# Patient Record
Sex: MALE | Race: BLACK OR AFRICAN AMERICAN | NOT HISPANIC OR LATINO | Employment: UNEMPLOYED | URBAN - METROPOLITAN AREA
[De-identification: names, ages, dates, MRNs, and addresses within clinical notes are randomized per-mention and may not be internally consistent; named-entity substitution may affect disease eponyms.]

---

## 2018-03-30 ENCOUNTER — OFFICE VISIT (OUTPATIENT)
Dept: URGENT CARE | Facility: CLINIC | Age: 23
End: 2018-03-30
Payer: MEDICAID

## 2018-03-30 VITALS
OXYGEN SATURATION: 99 % | WEIGHT: 172 LBS | SYSTOLIC BLOOD PRESSURE: 122 MMHG | HEIGHT: 74 IN | RESPIRATION RATE: 16 BRPM | TEMPERATURE: 98 F | DIASTOLIC BLOOD PRESSURE: 76 MMHG | HEART RATE: 68 BPM | BODY MASS INDEX: 22.07 KG/M2
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
## 2018-03-30 DIAGNOSIS — M54.2 CERVICALGIA: Primary | ICD-10-CM

## 2018-03-30 PROCEDURE — 99203 OFFICE O/P NEW LOW 30 MIN: CPT | Mod: S$GLB,,, | Performed by: SURGERY

## 2018-03-30 RX ORDER — DEXAMETHASONE SODIUM PHOSPHATE 100 MG/10ML
10 INJECTION INTRAMUSCULAR; INTRAVENOUS ONCE
Status: COMPLETED | OUTPATIENT
Start: 2018-03-30 | End: 2018-03-30

## 2018-03-30 RX ADMIN — DEXAMETHASONE SODIUM PHOSPHATE 10 MG: 100 INJECTION INTRAMUSCULAR; INTRAVENOUS at 04:03
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
## 2018-10-09 ENCOUNTER — HOSPITAL ENCOUNTER (EMERGENCY)
Facility: HOSPITAL | Age: 23
Discharge: HOME | End: 2018-10-10
Attending: EMERGENCY MEDICINE
Payer: MEDICAID

## 2018-10-09 VITALS
SYSTOLIC BLOOD PRESSURE: 138 MMHG | HEART RATE: 59 BPM | DIASTOLIC BLOOD PRESSURE: 70 MMHG | BODY MASS INDEX: 23.1 KG/M2 | HEIGHT: 74 IN | WEIGHT: 180 LBS | OXYGEN SATURATION: 100 % | RESPIRATION RATE: 14 BRPM | TEMPERATURE: 98 F

## 2018-10-09 DIAGNOSIS — H57.89 IRRITATION OF RIGHT EYE: Primary | ICD-10-CM

## 2018-10-09 DIAGNOSIS — H53.8 BLURRED VISION, RIGHT EYE: ICD-10-CM

## 2018-10-09 PROCEDURE — 99282 EMERGENCY DEPT VISIT SF MDM: CPT

## 2018-10-09 RX ORDER — TETRACAINE HYDROCHLORIDE 5 MG/ML
2 SOLUTION OPHTHALMIC
Status: DISCONTINUED | OUTPATIENT
Start: 2018-10-09 | End: 2018-10-10 | Stop reason: HOSPADM

## 2018-10-10 NOTE — DISCHARGE INSTRUCTIONS
Follow-up with ophthalmology tomorrow for reevaluation of R eye irritation, blurred vision. Return to this ED if vision worsens, if any other problems occur.

## 2018-10-10 NOTE — ED TRIAGE NOTES
"Pt reports right eye irritation that started today about 1500. States, "I think my contact was dirty".   "

## 2018-10-10 NOTE — ED PROVIDER NOTES
"Encounter Date: 10/9/2018       History     Chief Complaint   Patient presents with    Eye Pain     Pt reports right eye irritation that started today about 1500.  States, "I think my contact was dirty".      21yo M with no significant pmh on file with chief complaint R eye irritation which began around 3pm today. Pt states he was cooking, began with eye itching and blurred vision to R eye; does not know if something hit his eye. He removed his contact. Denies relief after removal. Presents to ED due to persistent itching, irritated sensation to OD. He denies FB sensation. Denies previous injury. Admits to blurred vision to OD. No loss of visual field. No hemianopsia. No headache. Denies eye pain. Denies eye drainage. +photophobia to bilateral eyes. No alleviating factors. No radiation of complaints. Severity 8/10.          Review of patient's allergies indicates:  No Known Allergies  History reviewed. No pertinent past medical history.  History reviewed. No pertinent surgical history.  History reviewed. No pertinent family history.  Social History     Tobacco Use    Smoking status: Never Smoker   Substance Use Topics    Alcohol use: No    Drug use: No     Review of Systems   Constitutional: Negative for fever.   HENT: Negative for sore throat.    Eyes: Positive for photophobia, itching and visual disturbance. Negative for pain, discharge and redness.   Respiratory: Negative for shortness of breath.    Cardiovascular: Negative for chest pain.   Gastrointestinal: Negative for nausea.   Genitourinary: Negative for dysuria.   Musculoskeletal: Negative for back pain.   Skin: Negative for rash.   Neurological: Negative for weakness.   Hematological: Does not bruise/bleed easily.       Physical Exam     Initial Vitals [10/09/18 2202]   BP Pulse Resp Temp SpO2   138/70 (!) 59 14 97.9 °F (36.6 °C) 100 %      MAP       --         Physical Exam    Nursing note and vitals reviewed.  Constitutional: He appears " well-developed and well-nourished. He is not diaphoretic. No distress.   HENT:   Head: Normocephalic and atraumatic.   Eyes: Conjunctivae and EOM are normal. Pupils are equal, round, and reactive to light.   No periorbital swelling or erythema. No rash or vesicular lesions near eye. No proptosis. Full EOMs bilaterally without pain. PERRLA. No pain with consensual reflex. No scleral hyperemia or subconjunctival hemorrhage. No fluorescein uptake. No dendritic lesions. Negative Vicki's sign. No anterior chamber bulge. No cloudy cornea. IOP: OD 12, OS 11. No foreign body.   Neck: Normal range of motion. Neck supple.   Cardiovascular: Intact distal pulses.   Pulmonary/Chest: Breath sounds normal. No respiratory distress. He has no wheezes.   Abdominal: Soft. Bowel sounds are normal. He exhibits no distension. There is no tenderness.   Musculoskeletal: Normal range of motion. He exhibits no tenderness.   Neurological: He is alert and oriented to person, place, and time. He has normal strength.   Skin: Skin is warm and dry. Capillary refill takes less than 2 seconds. No rash and no abscess noted. No erythema.   Psychiatric: He has a normal mood and affect. His behavior is normal. Judgment and thought content normal.         ED Course   Procedures  Labs Reviewed - No data to display       Imaging Results    None          Medical Decision Making:   Differential Diagnosis:   Corneal abrasion, glaucoma, keratitis, subconjunctival hemorrhage,  ED Management:  22-year-old male with chief complaint right eye irritation, foreign body sensation.  Denies trauma. Denies previous injury. Exam unremarkable. No scleral hyperemia, no epithelial or corneal defect. No fluorescein uptake. No corneal opacity. IOP wnl bilaterally. Visual acuity poor; pt states cannot see without contacts. Pt needs to f/u with ophthalmology.                      Clinical Impression:   The primary encounter diagnosis was Irritation of right eye. A diagnosis of  Blurred vision, right eye was also pertinent to this visit.      Disposition:   Disposition: Discharged  Condition: Stable                        Juan Antonio Radford PA-C  10/10/18 0009

## 2023-12-12 ENCOUNTER — HOSPITAL ENCOUNTER (EMERGENCY)
Facility: HOSPITAL | Age: 28
Discharge: HOME | End: 2023-12-12
Attending: EMERGENCY MEDICINE
Payer: MEDICAID

## 2023-12-12 VITALS
SYSTOLIC BLOOD PRESSURE: 122 MMHG | TEMPERATURE: 99 F | RESPIRATION RATE: 18 BRPM | HEART RATE: 87 BPM | DIASTOLIC BLOOD PRESSURE: 78 MMHG | WEIGHT: 205 LBS | BODY MASS INDEX: 24.96 KG/M2 | HEIGHT: 76 IN | OXYGEN SATURATION: 99 %

## 2023-12-12 DIAGNOSIS — J10.1 INFLUENZA A: Primary | ICD-10-CM

## 2023-12-12 LAB
CTP QC/QA: YES
INFLUENZA A ANTIGEN, POC: POSITIVE
INFLUENZA B ANTIGEN, POC: NEGATIVE
SARS-COV-2 RDRP RESP QL NAA+PROBE: NEGATIVE

## 2023-12-12 PROCEDURE — 87635 SARS-COV-2 COVID-19 AMP PRB: CPT | Mod: ER | Performed by: EMERGENCY MEDICINE

## 2023-12-12 PROCEDURE — 87804 INFLUENZA ASSAY W/OPTIC: CPT | Mod: 59,ER

## 2023-12-12 PROCEDURE — 99283 EMERGENCY DEPT VISIT LOW MDM: CPT | Mod: ER

## 2023-12-12 PROCEDURE — 87491 CHLMYD TRACH DNA AMP PROBE: CPT | Performed by: PHYSICIAN ASSISTANT

## 2023-12-12 RX ORDER — OSELTAMIVIR PHOSPHATE 75 MG/1
75 CAPSULE ORAL 2 TIMES DAILY
Qty: 10 CAPSULE | Refills: 0 | Status: SHIPPED | OUTPATIENT
Start: 2023-12-12 | End: 2023-12-17

## 2023-12-12 NOTE — Clinical Note
"Ashley Roberson"Cl was seen and treated in our emergency department on 12/12/2023.  He may return to work on 12/15/2023.       If you have any questions or concerns, please don't hesitate to call.      Cindi Kennedy PA-C"

## 2023-12-12 NOTE — ED PROVIDER NOTES
Encounter Date: 12/12/2023       History     Chief Complaint   Patient presents with    Fever    URI     A 29 y/o male presents to the ER jeanette URI symptoms x 3 days. Pt reports taking OTC medication without relief.      This is a 28 year old male who presents to the ED complaining of a 2 day history of subjective fever, cough (dry) , mild nasal congestion, body aches, and headache.  He denies any sore throat, ear pain but reports some chills, fatigue,vomiting and diarrhea.  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  He took over the counter cough medication without relief.  There are no additional alleviating or exacerbating factors.  No other associated symptoms.      The history is provided by the patient. No  was used.     Review of patient's allergies indicates:  No Known Allergies  No past medical history on file.  No past surgical history on file.  No family history on file.  Social History     Tobacco Use    Smoking status: Never   Substance Use Topics    Alcohol use: No    Drug use: No     Review of Systems   Constitutional:  Positive for chills, fatigue and fever.   HENT:  Positive for congestion and rhinorrhea. Negative for sneezing and sore throat.    Eyes:  Negative for pain.   Respiratory:  Positive for cough. Negative for chest tightness and shortness of breath.    Cardiovascular:  Negative for chest pain.   Gastrointestinal:  Negative for abdominal pain and nausea.   Genitourinary:  Negative for dysuria.   Musculoskeletal:  Positive for myalgias. Negative for back pain.   Skin:  Negative for rash.   Neurological:  Positive for headaches. Negative for weakness.   Hematological:  Does not bruise/bleed easily.       Physical Exam     Initial Vitals [12/12/23 1208]   BP Pulse Resp Temp SpO2   128/68 95 18 98.8 °F (37.1 °C) 98 %      MAP       --         Physical Exam    Nursing note and vitals reviewed.  Constitutional: He appears well-developed and well-nourished. He  is not diaphoretic. No distress.   HENT:   Head: Normocephalic and atraumatic.   Right Ear: External ear normal.   Left Ear: External ear normal.   Mouth/Throat: No oropharyngeal exudate.   There is bilateral nasal congestion noted.     Eyes: EOM are normal. Pupils are equal, round, and reactive to light.   Neck: Neck supple.   Normal range of motion.  Cardiovascular:  Normal rate.           Pulmonary/Chest: Breath sounds normal. No respiratory distress. He has no wheezes. He has no rhonchi. He has no rales.   Abdominal: Abdomen is soft. Bowel sounds are normal. He exhibits no distension. There is no abdominal tenderness. There is no rebound and no guarding.   Musculoskeletal:         General: No tenderness or edema. Normal range of motion.      Cervical back: Normal range of motion and neck supple.     Neurological: He is alert and oriented to person, place, and time.   Skin: Skin is warm. Capillary refill takes less than 2 seconds.         ED Course   Procedures  Labs Reviewed   POCT RAPID INFLUENZA A/B - Abnormal; Notable for the following components:       Result Value    Inflenza A Ag positive (*)     All other components within normal limits   C. TRACHOMATIS/N. GONORRHOEAE BY AMP DNA   SARS-COV-2 RDRP GENE    Narrative:     This test utilizes isothermal nucleic acid amplification technology to detect the SARS-CoV-2 RdRp nucleic acid segment. The analytical sensitivity (limit of detection) is 500 copies/swab.     A POSITIVE result is indicative of the presence of SARS-CoV-2 RNA; clinical correlation with patient history and other diagnostic information is necessary to determine patient infection status.    A NEGATIVE result means that SARS-CoV-2 nucleic acids are not present above the limit of detection. A NEGATIVE result should be treated as presumptive. It does not rule out the possibility of COVID-19 and should not be the sole basis for treatment decisions. If COVID-19 is strongly suspected based on clinical  and exposure history, re-testing using an alternate molecular assay should be considered.     This test is only for use under the Food and Drug Administration s Emergency Use Authorization (EUA).     Commercial kits are provided by [employer]. Performance characteristics of the EUA have been independently verified by [facility] Department of Pathology and Laboratory Medicine.   _________________________________________________________________   The authorized Fact Sheet for Healthcare Providers and the authorized Fact Sheet for Patients of the ID NOW COVID-19 are available on the FDA website:    https://www.fda.gov/media/500935/download      https://www.fda.gov/media/729558/download             Imaging Results    None          Medications - No data to display  Medical Decision Making  Amount and/or Complexity of Data Reviewed  Labs: ordered.    Risk  Prescription drug management.         APC / Resident Notes:   This is an urgent evaluation of a 28-year-old male who presents to the emergency department with flu-like symptoms.  He is also requesting to be screened for STDs.  He denies any urinary symptoms.      The patient is currently afebrile and nontoxic in appearance.  Vital signs are stable.  Physical exam is unremarkable for mild bilateral nasal congestion.  My differential diagnosis includes COVID, influenza, strep pharyngitis, pneumonia.  While in the emergency room rapid COVID test was performed which was negative.  Rapid influenza was positive for influenza A.  I carefully considered but doubt pneumonia, strep pharyngitis, meningitis.  I will treat with Tamiflu.  In addition, a genprobe for gonorrhea and chlamydia was sent and is pending.  Treatment was discussed with the patient verbalized understanding and agreement.  He was stable at discharge.                                 Clinical Impression:  Final diagnoses:  [J10.1] Influenza A (Primary)          ED Disposition Condition     Discharge Stable          ED Prescriptions       Medication Sig Dispense Start Date End Date Auth. Provider    oseltamivir (TAMIFLU) 75 MG capsule Take 1 capsule (75 mg total) by mouth 2 (two) times daily. for 5 days 10 capsule 12/12/2023 12/17/2023 Cindi Kennedy PA-C          Follow-up Information       Follow up With Specialties Details Why Contact Info    Jorgito Mckeon MD Pediatrics   [address] [place] [address]  [id number]  [facility] [address]  [phone]               Cindi Kennedy PA-C  12/15/23 3613

## 2023-12-12 NOTE — DISCHARGE INSTRUCTIONS
Take medication as prescribed.  Rest.  Stay away from others.  Return to the emergency department for any change or concerning symptoms.

## 2023-12-14 LAB
C TRACH DNA SPEC QL NAA+PROBE: NOT DETECTED
N GONORRHOEA DNA SPEC QL NAA+PROBE: NOT DETECTED

## 2024-03-11 ENCOUNTER — HOSPITAL ENCOUNTER (EMERGENCY)
Facility: HOSPITAL | Age: 29
Discharge: HOME | End: 2024-03-11
Attending: EMERGENCY MEDICINE
Payer: MEDICAID

## 2024-03-11 VITALS
BODY MASS INDEX: 25.57 KG/M2 | HEIGHT: 76 IN | HEART RATE: 77 BPM | OXYGEN SATURATION: 100 % | RESPIRATION RATE: 18 BRPM | SYSTOLIC BLOOD PRESSURE: 127 MMHG | WEIGHT: 210 LBS | TEMPERATURE: 99 F | DIASTOLIC BLOOD PRESSURE: 83 MMHG

## 2024-03-11 DIAGNOSIS — T14.90XA TRAUMA: ICD-10-CM

## 2024-03-11 DIAGNOSIS — S93.401A SPRAIN OF RIGHT ANKLE, UNSPECIFIED LIGAMENT, INITIAL ENCOUNTER: Primary | ICD-10-CM

## 2024-03-11 DIAGNOSIS — M79.671 FOOT PAIN, RIGHT: ICD-10-CM
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
## 2024-03-11 DIAGNOSIS — M25.559 HIP PAIN: ICD-10-CM

## 2024-03-11 PROCEDURE — 25000003 PHARM REV CODE 250: Mod: ER | Performed by: EMERGENCY MEDICINE

## 2024-03-11 PROCEDURE — 99284 EMERGENCY DEPT VISIT MOD MDM: CPT | Mod: 25,ER

## 2024-03-11 RX ORDER — KETOROLAC TROMETHAMINE 10 MG/1
10 TABLET, FILM COATED ORAL
Status: COMPLETED | OUTPATIENT
Start: 2024-03-11 | End: 2024-03-11

## 2024-03-11 RX ORDER — DICLOFENAC SODIUM 10 MG/G
2 GEL TOPICAL 4 TIMES DAILY PRN
Qty: 200 G | Refills: 0 | Status: SHIPPED | OUTPATIENT
Start: 2024-03-11

## 2024-03-11 RX ORDER — METHOCARBAMOL 500 MG/1
1000 TABLET, FILM COATED ORAL 3 TIMES DAILY
Qty: 30 TABLET | Refills: 0 | Status: SHIPPED | OUTPATIENT
Start: 2024-03-11 | End: 2024-03-16

## 2024-03-11 RX ORDER — IBUPROFEN 600 MG/1
600 TABLET ORAL EVERY 6 HOURS PRN
Qty: 20 TABLET | Refills: 0 | Status: SHIPPED | OUTPATIENT
Start: 2024-03-11

## 2024-03-11 RX ORDER — ACETAMINOPHEN 500 MG
500 TABLET ORAL EVERY 6 HOURS PRN
Qty: 30 TABLET | Refills: 0 | Status: SHIPPED | OUTPATIENT
Start: 2024-03-11

## 2024-03-11 RX ADMIN — KETOROLAC TROMETHAMINE 10 MG: 10 TABLET, FILM COATED ORAL at 11:03

## 2024-03-11 NOTE — Clinical Note
"Ashley Roberson" Cl was seen and treated in our emergency department on 3/11/2024.  He should be cleared by a physician before returning to gym class or sports on 03/25/2024.      If you have any questions or concerns, please don't hesitate to call.      Yessy De La Torre, DO"

## 2024-03-11 NOTE — ED PROVIDER NOTES
"Encounter Date: 3/11/2024    SCRIBE #1 NOTE: I, Vikki Beth, am scribing for, and in the presence of,  Yessy De La Torre DO.       History     Chief Complaint   Patient presents with    Ankle Pain     A 27 y/o male presents to the ER c/o (right) ankle pain post injury last night. Pt reports "rolling ankle" while playing basketball. +Pain, +LROM, +Unable to bear weight on joint.      Ashley Smallwood is a 28 y.o. male, with no pertinent PMHx, who presents to the ED with right foot and ankle pain after rolling on his ankle while playing basketball last night. Patient reports attempted Tx w/ ice and epsom salt w/o relief. Denies daily medications or health problems. NKDA. No other exacerbating or alleviating factors. Denies fever, CP, SOB, numbness, weakness or other associated symptoms.       The history is provided by the patient. No  was used.     Review of patient's allergies indicates:  No Known Allergies  No past medical history on file.  No past surgical history on file.  No family history on file.  Social History     Tobacco Use    Smoking status: Never   Substance Use Topics    Alcohol use: No    Drug use: No     Review of Systems   Constitutional:  Negative for fever.   HENT:  Negative for rhinorrhea and sore throat.    Respiratory:  Negative for shortness of breath.    Cardiovascular:  Negative for chest pain and leg swelling.   Musculoskeletal:         (+) right foot pain  (+) right ankle pain   Skin:  Negative for rash.   Neurological:  Negative for weakness and numbness.   All other systems reviewed and are negative.      Physical Exam     Initial Vitals [03/11/24 1128]   BP Pulse Resp Temp SpO2   127/83 77 18 98.7 °F (37.1 °C) 100 %      MAP       --         Physical Exam    Nursing note and vitals reviewed.  Constitutional: He appears well-developed and well-nourished.   HENT:   Head: Normocephalic and atraumatic.   Right Ear: External ear normal.   Left Ear: External ear normal.   Nose: " Nose normal.   Mouth/Throat: Oropharynx is clear and moist.   Eyes: Conjunctivae and EOM are normal.   Neck: Neck supple.   Normal range of motion.  Cardiovascular:  Normal rate, regular rhythm and normal heart sounds.     Exam reveals no gallop and no friction rub.       No murmur heard.  Pulmonary/Chest: Breath sounds normal. No respiratory distress. He has no wheezes. He has no rhonchi. He has no rales.   Musculoskeletal:         General: No edema. Normal range of motion.      Cervical back: Normal range of motion and neck supple.      Right hip: No tenderness.      Left hip: No tenderness.      Right upper leg: No tenderness.      Left upper leg: No tenderness.      Right knee: Normal. No tenderness.      Left knee: Normal. No tenderness.      Right lower leg: No tenderness.      Left lower leg: No tenderness.      Right ankle: Swelling present. Tenderness present.      Left ankle: Normal.      Right foot: Swelling and tenderness present.     Neurological: He is alert and oriented to person, place, and time. No sensory deficit.   Skin: Skin is warm and dry. Capillary refill takes less than 2 seconds. No rash noted.   Psychiatric: He has a normal mood and affect. His behavior is normal.       Patient gave consent to have physical exam performed.      ED Course   Procedures  Labs Reviewed - No data to display       Imaging Results              X-Ray Femur Ap/Lat Left (Final result)  Result time 03/11/24 12:30:37      Final result by Romero Cruz MD (03/11/24 12:30:37)                   Impression:      No acute displaced fracture-dislocation identified.      Electronically signed by: Romero Cruz MD  Date:    03/11/2024  Time:    12:30               Narrative:    EXAMINATION:  XR FEMUR 2 VIEW LEFT; XR HIP WITH PELVIS WHEN PERFORMED, 2 OR 3  VIEWS RIGHT    CLINICAL HISTORY:  Unspecified fall, initial encounter; Pain in unspecified hip    TECHNIQUE:  AP and lateral views of the left femur; two views right hip  series    COMPARISON:  None.    FINDINGS:  Bones are well mineralized. Overall alignment is within normal limits. No displaced fracture, dislocation or destructive osseous process. Joint spaces appear relatively maintained.  Small enthesophyte at the superior patellar pole.  No large suprapatellar joint effusion seen.  No subcutaneous emphysema or radiodense retained foreign body.  Few scattered nonspecific pelvic phleboliths noted.                                       X-Ray Hip 2 or 3 views Right (with Pelvis when performed) (Final result)  Result time 03/11/24 12:30:37      Final result by Romero Cruz MD (03/11/24 12:30:37)                   Impression:      No acute displaced fracture-dislocation identified.      Electronically signed by: Romero Cruz MD  Date:    03/11/2024  Time:    12:30               Narrative:    EXAMINATION:  XR FEMUR 2 VIEW LEFT; XR HIP WITH PELVIS WHEN PERFORMED, 2 OR 3  VIEWS RIGHT    CLINICAL HISTORY:  Unspecified fall, initial encounter; Pain in unspecified hip    TECHNIQUE:  AP and lateral views of the left femur; two views right hip series    COMPARISON:  None.    FINDINGS:  Bones are well mineralized. Overall alignment is within normal limits. No displaced fracture, dislocation or destructive osseous process. Joint spaces appear relatively maintained.  Small enthesophyte at the superior patellar pole.  No large suprapatellar joint effusion seen.  No subcutaneous emphysema or radiodense retained foreign body.  Few scattered nonspecific pelvic phleboliths noted.                                       X-Ray Foot Complete Right (Final result)  Result time 03/11/24 12:32:47      Final result by Romero Cruz MD (03/11/24 12:32:47)                   Impression:      Lateral right ankle and dorsal hindfoot/midfoot nonspecific soft tissue swelling without acute displaced fracture-dislocation identified, which may represent sprain.      Electronically signed by: Romero Cruz  MD  Date:    03/11/2024  Time:    12:32               Narrative:    EXAMINATION:  XR ANKLE COMPLETE 3 VIEW RIGHT; XR FOOT COMPLETE 3 VIEW RIGHT    CLINICAL HISTORY:  Injury, unspecified, initial encounter; Pain in right foot    TECHNIQUE:  AP, lateral, and oblique images of the right ankle and right foot were performed.    COMPARISON:  None    FINDINGS:  Bones are well mineralized. Overall alignment is within normal limits.  Nonspecific soft tissue swelling overlying the lateral malleolus and dorsal aspect of the midfoot/hindfoot.  Ankle mortise is otherwise intact.  Hallux valgus configuration.  Lisfranc articulation is congruent.  No displaced fracture, dislocation or destructive osseous process.  Mild DJD at the 1st MTP joint.  No subcutaneous emphysema or radiodense retained foreign body.                                       X-Ray Ankle Complete Right (Final result)  Result time 03/11/24 12:32:47      Final result by Romero Cruz MD (03/11/24 12:32:47)                   Impression:      Lateral right ankle and dorsal hindfoot/midfoot nonspecific soft tissue swelling without acute displaced fracture-dislocation identified, which may represent sprain.      Electronically signed by: Romero Cruz MD  Date:    03/11/2024  Time:    12:32               Narrative:    EXAMINATION:  XR ANKLE COMPLETE 3 VIEW RIGHT; XR FOOT COMPLETE 3 VIEW RIGHT    CLINICAL HISTORY:  Injury, unspecified, initial encounter; Pain in right foot    TECHNIQUE:  AP, lateral, and oblique images of the right ankle and right foot were performed.    COMPARISON:  None    FINDINGS:  Bones are well mineralized. Overall alignment is within normal limits.  Nonspecific soft tissue swelling overlying the lateral malleolus and dorsal aspect of the midfoot/hindfoot.  Ankle mortise is otherwise intact.  Hallux valgus configuration.  Lisfranc articulation is congruent.  No displaced fracture, dislocation or destructive osseous process.  Mild DJD at the 1st  "MTP joint.  No subcutaneous emphysema or radiodense retained foreign body.                                       Medications   ketorolac tablet 10 mg (10 mg Oral Given 3/11/24 1156)     Medical Decision Making  Amount and/or Complexity of Data Reviewed  Radiology: ordered.    Risk  OTC drugs.  Prescription drug management.    Medical Decision Making:    This is an evaluation of a 28 y.o. male that presents to the Emergency Department for right foot and ankle pain  Chief Complaint   Patient presents with    Ankle Pain     A 29 y/o male presents to the ER c/o (right) ankle pain post injury last night. Pt reports "rolling ankle" while playing basketball. +Pain, +LROM, +Unable to bear weight on joint.        The patient is a non-toxic and well appearing patient. On physical exam, patient appears well hydrated with moist mucus membranes. Breath sounds are clear and equal bilaterally with no adventitious breath sounds, tachypnea or respiratory distress. Regular rate and rhythm. No murmurs. There is tenderness and swelling to the right foot and ankle. No right knee, leg or hip tenderness.  Physical exam otherwise as above.     I have reviewed vital signs and nursing notes.   Vital Signs Are Reassuring.     Based on the patient's symptoms, I am considering and evaluating for the following differential diagnoses: Sprain, Strain, Contusion, Dislocation, Fracture.    Consider hospitalization for trauma.  Patient is agreeable to transfer and admission to [facility] if necessary.    ED Course:Treatment in the ED included Physical Exam and medications given in ED  Medications   ketorolac tablet 10 mg (10 mg Oral Given 3/11/24 1156)   .   Patient reports feeling better after treatment in the ER.       External Data/Documents Reviewed: Previous medical records and vital signs reviewed, see HPI and Physical exam.     Radiology: ordered as indicated and reviewed.  No acute fracture appreciated on " x-ray    Risk  Diagnosis or treatment significantly limited by the following social determinants of health: Body mass index is 25.56 kg/m².     In shared decision making with the patient, we discussed treatment, prescriptions, labs, and imaging results.    Discharge home with   ED Prescriptions       Medication Sig Dispense Start Date End Date Auth. Provider    methocarbamoL (ROBAXIN) 500 MG Tab Take 2 tablets (1,000 mg total) by mouth 3 (three) times daily. for 5 days 30 tablet 3/11/2024 3/16/2024 Yessy De La Torre DO    acetaminophen (TYLENOL) 500 MG tablet Take 1 tablet (500 mg total) by mouth every 6 (six) hours as needed for Pain (As needed for mild-to-moderate pain). 30 tablet 3/11/2024 -- Yessy De La Torre DO    diclofenac sodium (VOLTAREN) 1 % Gel Apply 2 g topically 4 (four) times daily as needed (Apply to painful area 4 times a day as needed for pain). 200 g 3/11/2024 -- Yessy De La Torre DO    ibuprofen (ADVIL,MOTRIN) 600 MG tablet Take 1 tablet (600 mg total) by mouth every 6 (six) hours as needed for Pain (Take with food as needed for mild-to-moderate pain). 20 tablet 3/11/2024 -- Yessy De La Torre DO          Fill and take prescriptions as directed.  Return to the ED if symptoms worsen or do not resolve.   Answered questions and discussed discharge plan.    Patient feels better and is ready for discharge.  Follow up with PCP/specialist in 1 day      At time of discharge patient is awake alert oriented x4 speaking clearly in full sentences and moving all 4 extremities.     The following labs and imaging were reviewed:        No visits with results within 1 Day(s) from this visit.   Latest known visit with results is:   Admission on 12/12/2023, Discharged on 12/12/2023   Component Date Value Ref Range Status    POC Rapid COVID 12/12/2023 Negative  Negative Final     Acceptable 12/12/2023 Yes   Final    Influenza B Ag 12/12/2023 negative  Positive/Negative Final    Inflenza A Ag 12/12/2023 positive (A)   Positive/Negative Final    Chlamydia, Amplified DNA 12/12/2023 Not Detected  Not Detected Final    N gonorrhoeae, amplified DNA 12/12/2023 Not Detected  Not Detected Final        Imaging Results              X-Ray Femur Ap/Lat Left (Final result)  Result time 03/11/24 12:30:37      Final result by Romero Cruz MD (03/11/24 12:30:37)                   Impression:      No acute displaced fracture-dislocation identified.      Electronically signed by: Romero Cruz MD  Date:    03/11/2024  Time:    12:30               Narrative:    EXAMINATION:  XR FEMUR 2 VIEW LEFT; XR HIP WITH PELVIS WHEN PERFORMED, 2 OR 3  VIEWS RIGHT    CLINICAL HISTORY:  Unspecified fall, initial encounter; Pain in unspecified hip    TECHNIQUE:  AP and lateral views of the left femur; two views right hip series    COMPARISON:  None.    FINDINGS:  Bones are well mineralized. Overall alignment is within normal limits. No displaced fracture, dislocation or destructive osseous process. Joint spaces appear relatively maintained.  Small enthesophyte at the superior patellar pole.  No large suprapatellar joint effusion seen.  No subcutaneous emphysema or radiodense retained foreign body.  Few scattered nonspecific pelvic phleboliths noted.                                       X-Ray Hip 2 or 3 views Right (with Pelvis when performed) (Final result)  Result time 03/11/24 12:30:37      Final result by Romero Cruz MD (03/11/24 12:30:37)                   Impression:      No acute displaced fracture-dislocation identified.      Electronically signed by: Romero Cruz MD  Date:    03/11/2024  Time:    12:30               Narrative:    EXAMINATION:  XR FEMUR 2 VIEW LEFT; XR HIP WITH PELVIS WHEN PERFORMED, 2 OR 3  VIEWS RIGHT    CLINICAL HISTORY:  Unspecified fall, initial encounter; Pain in unspecified hip    TECHNIQUE:  AP and lateral views of the left femur; two views right hip series    COMPARISON:  None.    FINDINGS:  Bones are well mineralized.  Overall alignment is within normal limits. No displaced fracture, dislocation or destructive osseous process. Joint spaces appear relatively maintained.  Small enthesophyte at the superior patellar pole.  No large suprapatellar joint effusion seen.  No subcutaneous emphysema or radiodense retained foreign body.  Few scattered nonspecific pelvic phleboliths noted.                                       X-Ray Foot Complete Right (Final result)  Result time 03/11/24 12:32:47      Final result by Romero Cruz MD (03/11/24 12:32:47)                   Impression:      Lateral right ankle and dorsal hindfoot/midfoot nonspecific soft tissue swelling without acute displaced fracture-dislocation identified, which may represent sprain.      Electronically signed by: Romero Cruz MD  Date:    03/11/2024  Time:    12:32               Narrative:    EXAMINATION:  XR ANKLE COMPLETE 3 VIEW RIGHT; XR FOOT COMPLETE 3 VIEW RIGHT    CLINICAL HISTORY:  Injury, unspecified, initial encounter; Pain in right foot    TECHNIQUE:  AP, lateral, and oblique images of the right ankle and right foot were performed.    COMPARISON:  None    FINDINGS:  Bones are well mineralized. Overall alignment is within normal limits.  Nonspecific soft tissue swelling overlying the lateral malleolus and dorsal aspect of the midfoot/hindfoot.  Ankle mortise is otherwise intact.  Hallux valgus configuration.  Lisfranc articulation is congruent.  No displaced fracture, dislocation or destructive osseous process.  Mild DJD at the 1st MTP joint.  No subcutaneous emphysema or radiodense retained foreign body.                                       X-Ray Ankle Complete Right (Final result)  Result time 03/11/24 12:32:47      Final result by Romero Cruz MD (03/11/24 12:32:47)                   Impression:      Lateral right ankle and dorsal hindfoot/midfoot nonspecific soft tissue swelling without acute displaced fracture-dislocation identified, which may  represent sprain.      Electronically signed by: Romero Cruz MD  Date:    03/11/2024  Time:    12:32               Narrative:    EXAMINATION:  XR ANKLE COMPLETE 3 VIEW RIGHT; XR FOOT COMPLETE 3 VIEW RIGHT    CLINICAL HISTORY:  Injury, unspecified, initial encounter; Pain in right foot    TECHNIQUE:  AP, lateral, and oblique images of the right ankle and right foot were performed.    COMPARISON:  None    FINDINGS:  Bones are well mineralized. Overall alignment is within normal limits.  Nonspecific soft tissue swelling overlying the lateral malleolus and dorsal aspect of the midfoot/hindfoot.  Ankle mortise is otherwise intact.  Hallux valgus configuration.  Lisfranc articulation is congruent.  No displaced fracture, dislocation or destructive osseous process.  Mild DJD at the 1st MTP joint.  No subcutaneous emphysema or radiodense retained foreign body.                                            Scribe Attestation:   Scribe #1: I performed the above scribed service and the documentation accurately describes the services I performed. I attest to the accuracy of the note.                            I, Dr. Yessy De La Torre, personally performed the services described in this documentation. This document was produced by a scribe under my direction and in my presence. All medical record entries made by the scribe were at my direction and in my presence.  I have reviewed the chart and agree that the record reflects my personal performance and is accurate and complete. Yessy De La Torre DO.     03/11/2024 5:38 PM      Clinical Impression:  Final diagnoses:  [T14.90XA] Trauma  [M79.671] Foot pain, right  [M25.559] Hip pain  [W19.XXXA] Fall  [S93.401A] Sprain of right ankle, unspecified ligament, initial encounter (Primary)          ED Disposition Condition    Discharge Stable          ED Prescriptions       Medication Sig Dispense Start Date End Date Auth. Provider    methocarbamoL (ROBAXIN) 500 MG Tab Take 2 tablets  (1,000 mg total) by mouth 3 (three) times daily. for 5 days 30 tablet 3/11/2024 3/16/2024 Yessy De La Torre DO    acetaminophen (TYLENOL) 500 MG tablet Take 1 tablet (500 mg total) by mouth every 6 (six) hours as needed for Pain (As needed for mild-to-moderate pain). 30 tablet 3/11/2024 -- Yessy De La Torre DO    diclofenac sodium (VOLTAREN) 1 % Gel Apply 2 g topically 4 (four) times daily as needed (Apply to painful area 4 times a day as needed for pain). 200 g 3/11/2024 -- Yessy De La Torre DO    ibuprofen (ADVIL,MOTRIN) 600 MG tablet Take 1 tablet (600 mg total) by mouth every 6 (six) hours as needed for Pain (Take with food as needed for mild-to-moderate pain). 20 tablet 3/11/2024 -- Yessy De La Torre DO          Follow-up Information       Follow up With Specialties Details Why Contact Info    Jorgito Mckeon MD Pediatrics Schedule an appointment as soon as possible for a visit  As needed [address] [place] [address]  [id number]  [place] [address]  [phone]      Tesha Castaneda MD Orthopedic Surgery Schedule an appointment as soon as possible for a visit in 1 day Follow-up for further evaluation of your ankle pain as needed. [address] [facility] [address]  [phone]               Yessy De La Torre DO  03/11/24 1468

## 2024-03-11 NOTE — Clinical Note
"Ashley Roberson"Cl was seen and treated in our emergency department on 3/11/2024.  He may return to work on 03/13/2024.       If you have any questions or concerns, please don't hesitate to call.      Yessy De La Torre, DO"

## 2024-08-21 ENCOUNTER — PATIENT OUTREACH (OUTPATIENT)
Dept: ADMINISTRATIVE | Facility: OTHER | Age: 29
End: 2024-08-21
Payer: MEDICAID

## 2024-08-21 ENCOUNTER — OFFICE VISIT (OUTPATIENT)
Facility: CLINIC | Age: 29
End: 2024-08-21
Payer: MEDICAID

## 2024-08-21 ENCOUNTER — LAB VISIT (OUTPATIENT)
Dept: LAB | Facility: HOSPITAL | Age: 29
End: 2024-08-21
Attending: STUDENT IN AN ORGANIZED HEALTH CARE EDUCATION/TRAINING PROGRAM
Payer: MEDICAID

## 2024-08-21 VITALS
HEART RATE: 74 BPM | WEIGHT: 202.38 LBS | SYSTOLIC BLOOD PRESSURE: 106 MMHG | RESPIRATION RATE: 18 BRPM | DIASTOLIC BLOOD PRESSURE: 70 MMHG | BODY MASS INDEX: 24.64 KG/M2 | HEIGHT: 76 IN | OXYGEN SATURATION: 99 %

## 2024-08-21 DIAGNOSIS — F41.1 GENERALIZED ANXIETY DISORDER: ICD-10-CM

## 2024-08-21 DIAGNOSIS — Z11.3 ROUTINE SCREENING FOR STI (SEXUALLY TRANSMITTED INFECTION): ICD-10-CM

## 2024-08-21 DIAGNOSIS — L84 CALLUS OF FOOT: ICD-10-CM

## 2024-08-21 DIAGNOSIS — Z00.00 ANNUAL PHYSICAL EXAM: ICD-10-CM

## 2024-08-21 DIAGNOSIS — N50.811 PAIN IN RIGHT TESTICLE: ICD-10-CM

## 2024-08-21 DIAGNOSIS — M25.551 PAIN OF RIGHT HIP: ICD-10-CM

## 2024-08-21 DIAGNOSIS — F34.1 DYSTHYMIA: Primary | ICD-10-CM

## 2024-08-21 LAB
ALBUMIN SERPL BCP-MCNC: 4.5 G/DL (ref 3.5–5.2)
ALP SERPL-CCNC: 58 U/L (ref 55–135)
ALT SERPL W/O P-5'-P-CCNC: 22 U/L (ref 10–44)
ANION GAP SERPL CALC-SCNC: 9 MMOL/L (ref 8–16)
AST SERPL-CCNC: 21 U/L (ref 10–40)
BASOPHILS # BLD AUTO: 0.04 K/UL (ref 0–0.2)
BASOPHILS NFR BLD: 0.7 % (ref 0–1.9)
BILIRUB SERPL-MCNC: 0.6 MG/DL (ref 0.1–1)
BUN SERPL-MCNC: 11 MG/DL (ref 6–20)
CALCIUM SERPL-MCNC: 9.9 MG/DL (ref 8.7–10.5)
CHLORIDE SERPL-SCNC: 104 MMOL/L (ref 95–110)
CHOLEST SERPL-MCNC: 154 MG/DL (ref 120–199)
CHOLEST/HDLC SERPL: 3.3 {RATIO} (ref 2–5)
CO2 SERPL-SCNC: 27 MMOL/L (ref 23–29)
CREAT SERPL-MCNC: 1.1 MG/DL (ref 0.5–1.4)
DIFFERENTIAL METHOD BLD: NORMAL
EOSINOPHIL # BLD AUTO: 0.1 K/UL (ref 0–0.5)
EOSINOPHIL NFR BLD: 1.6 % (ref 0–8)
ERYTHROCYTE [DISTWIDTH] IN BLOOD BY AUTOMATED COUNT: 13.4 % (ref 11.5–14.5)
EST. GFR  (NO RACE VARIABLE): >60 ML/MIN/1.73 M^2
ESTIMATED AVG GLUCOSE: 105 MG/DL (ref 68–131)
GLUCOSE SERPL-MCNC: 91 MG/DL (ref 70–110)
HBA1C MFR BLD: 5.3 % (ref 4–5.6)
HBV SURFACE AG SERPL QL IA: NORMAL
HCT VFR BLD AUTO: 50.3 % (ref 40–54)
HCV AB SERPL QL IA: NORMAL
HDLC SERPL-MCNC: 46 MG/DL (ref 40–75)
HDLC SERPL: 29.9 % (ref 20–50)
HGB BLD-MCNC: 16.1 G/DL (ref 14–18)
HIV 1+2 AB+HIV1 P24 AG SERPL QL IA: NORMAL
IMM GRANULOCYTES # BLD AUTO: 0.01 K/UL (ref 0–0.04)
IMM GRANULOCYTES NFR BLD AUTO: 0.2 % (ref 0–0.5)
LDLC SERPL CALC-MCNC: 92.6 MG/DL (ref 63–159)
LYMPHOCYTES # BLD AUTO: 2.3 K/UL (ref 1–4.8)
LYMPHOCYTES NFR BLD: 40.5 % (ref 18–48)
MCH RBC QN AUTO: 27.8 PG (ref 27–31)
MCHC RBC AUTO-ENTMCNC: 32 G/DL (ref 32–36)
MCV RBC AUTO: 87 FL (ref 82–98)
MONOCYTES # BLD AUTO: 0.5 K/UL (ref 0.3–1)
MONOCYTES NFR BLD: 9 % (ref 4–15)
NEUTROPHILS # BLD AUTO: 2.7 K/UL (ref 1.8–7.7)
NEUTROPHILS NFR BLD: 48 % (ref 38–73)
NONHDLC SERPL-MCNC: 108 MG/DL
NRBC BLD-RTO: 0 /100 WBC
PLATELET # BLD AUTO: 381 K/UL (ref 150–450)
PMV BLD AUTO: 9.6 FL (ref 9.2–12.9)
POTASSIUM SERPL-SCNC: 4.1 MMOL/L (ref 3.5–5.1)
PROT SERPL-MCNC: 7.4 G/DL (ref 6–8.4)
RBC # BLD AUTO: 5.79 M/UL (ref 4.6–6.2)
SODIUM SERPL-SCNC: 140 MMOL/L (ref 136–145)
TREPONEMA PALLIDUM IGG+IGM AB [PRESENCE] IN SERUM OR PLASMA BY IMMUNOASSAY: NONREACTIVE
TRIGL SERPL-MCNC: 77 MG/DL (ref 30–150)
TSH SERPL DL<=0.005 MIU/L-ACNC: 0.77 UIU/ML (ref 0.4–4)
WBC # BLD AUTO: 5.56 K/UL (ref 3.9–12.7)

## 2024-08-21 PROCEDURE — 99214 OFFICE O/P EST MOD 30 MIN: CPT | Mod: PBBFAC,PN | Performed by: STUDENT IN AN ORGANIZED HEALTH CARE EDUCATION/TRAINING PROGRAM

## 2024-08-21 PROCEDURE — 1160F RVW MEDS BY RX/DR IN RCRD: CPT | Mod: CPTII,,, | Performed by: STUDENT IN AN ORGANIZED HEALTH CARE EDUCATION/TRAINING PROGRAM

## 2024-08-21 PROCEDURE — 87340 HEPATITIS B SURFACE AG IA: CPT | Performed by: STUDENT IN AN ORGANIZED HEALTH CARE EDUCATION/TRAINING PROGRAM

## 2024-08-21 PROCEDURE — 86593 SYPHILIS TEST NON-TREP QUANT: CPT | Performed by: STUDENT IN AN ORGANIZED HEALTH CARE EDUCATION/TRAINING PROGRAM

## 2024-08-21 PROCEDURE — 80061 LIPID PANEL: CPT | Performed by: STUDENT IN AN ORGANIZED HEALTH CARE EDUCATION/TRAINING PROGRAM

## 2024-08-21 PROCEDURE — 84443 ASSAY THYROID STIM HORMONE: CPT | Performed by: STUDENT IN AN ORGANIZED HEALTH CARE EDUCATION/TRAINING PROGRAM

## 2024-08-21 PROCEDURE — 3074F SYST BP LT 130 MM HG: CPT | Mod: CPTII,,, | Performed by: STUDENT IN AN ORGANIZED HEALTH CARE EDUCATION/TRAINING PROGRAM

## 2024-08-21 PROCEDURE — 3078F DIAST BP <80 MM HG: CPT | Mod: CPTII,,, | Performed by: STUDENT IN AN ORGANIZED HEALTH CARE EDUCATION/TRAINING PROGRAM

## 2024-08-21 PROCEDURE — 85025 COMPLETE CBC W/AUTO DIFF WBC: CPT | Performed by: STUDENT IN AN ORGANIZED HEALTH CARE EDUCATION/TRAINING PROGRAM

## 2024-08-21 PROCEDURE — 36415 COLL VENOUS BLD VENIPUNCTURE: CPT | Performed by: STUDENT IN AN ORGANIZED HEALTH CARE EDUCATION/TRAINING PROGRAM

## 2024-08-21 PROCEDURE — 83036 HEMOGLOBIN GLYCOSYLATED A1C: CPT | Performed by: STUDENT IN AN ORGANIZED HEALTH CARE EDUCATION/TRAINING PROGRAM

## 2024-08-21 PROCEDURE — 86803 HEPATITIS C AB TEST: CPT | Performed by: STUDENT IN AN ORGANIZED HEALTH CARE EDUCATION/TRAINING PROGRAM

## 2024-08-21 PROCEDURE — 99204 OFFICE O/P NEW MOD 45 MIN: CPT | Mod: S$PBB,,, | Performed by: STUDENT IN AN ORGANIZED HEALTH CARE EDUCATION/TRAINING PROGRAM

## 2024-08-21 PROCEDURE — 99999 PR PBB SHADOW E&M-EST. PATIENT-LVL IV: CPT | Mod: PBBFAC,,, | Performed by: STUDENT IN AN ORGANIZED HEALTH CARE EDUCATION/TRAINING PROGRAM

## 2024-08-21 PROCEDURE — 87389 HIV-1 AG W/HIV-1&-2 AB AG IA: CPT | Performed by: STUDENT IN AN ORGANIZED HEALTH CARE EDUCATION/TRAINING PROGRAM

## 2024-08-21 PROCEDURE — 1159F MED LIST DOCD IN RCRD: CPT | Mod: CPTII,,, | Performed by: STUDENT IN AN ORGANIZED HEALTH CARE EDUCATION/TRAINING PROGRAM

## 2024-08-21 PROCEDURE — 80053 COMPREHEN METABOLIC PANEL: CPT | Performed by: STUDENT IN AN ORGANIZED HEALTH CARE EDUCATION/TRAINING PROGRAM

## 2024-08-21 PROCEDURE — 3008F BODY MASS INDEX DOCD: CPT | Mod: CPTII,,, | Performed by: STUDENT IN AN ORGANIZED HEALTH CARE EDUCATION/TRAINING PROGRAM

## 2024-08-21 RX ORDER — ESCITALOPRAM OXALATE 20 MG/1
TABLET ORAL
Qty: 57 TABLET | Refills: 0 | Status: SHIPPED | OUTPATIENT
Start: 2024-08-21 | End: 2024-10-20

## 2024-08-21 RX ORDER — GABAPENTIN 300 MG/1
300 CAPSULE ORAL 3 TIMES DAILY PRN
Qty: 90 CAPSULE | Refills: 1 | Status: SHIPPED | OUTPATIENT
Start: 2024-08-21 | End: 2025-08-21

## 2024-08-21 RX ORDER — UREA 40 %
CREAM (GRAM) TOPICAL NIGHTLY
Qty: 198 G | Refills: 0 | Status: SHIPPED | OUTPATIENT
Start: 2024-08-21 | End: 2024-09-20

## 2024-08-21 NOTE — PROGRESS NOTES
SUBJECTIVE     Chief Complaint   Patient presents with    [facility]    Annual Exam       HPI  Ashley Smallwood is a 28 y.o. male with multiple medical diagnoses as listed in the medical history and problem list that presents for annual exam.     Pt reports that he has been doing well since his last visit. He has a good appetite and eats a varied diet including fruit and vegetables. Primarily eats out, occasionally fast food, occasionally eats home cooked meals. He does engage in cardiovascular exercise (basketball, weightlifting every day). He is sleeping well at night. Gets 7 hours of sleep at night. Pt does not take any OTC supplements. He is under stress recently and mood has been fluctuating. He is sexually active and does request sexually transmitted disease testing. Pt is not UTD on age appropriate CA screening.     Pt reports right sciatic pain ongoing for the past 1-2 years. Has done PT in the past without significant improvement. Stretches at home without improvement. Does not take any medication for it.   Pt reports feeling symptoms of depression/anxiety. Feels antisocial, fatigued, and easily irritable. GAD7: 15, PHQ9: 15. Interested in trying therapy and/or medications.   Denies alcohol, smokes tobacco 1/day, smokes marijuana 1-2x/day.     Pt also c/o testicular pain which is intermittent, lasts a few minutes and then abates. No masses noted.     Works at [employer].    PAST MEDICAL HISTORY:  History reviewed. No pertinent past medical history.    PAST SURGICAL HISTORY:  History reviewed. No pertinent surgical history.    SOCIAL HISTORY:  Social History     Socioeconomic History    Marital status: Single   Tobacco Use    Smoking status: Never     Passive exposure: Never    Smokeless tobacco: Never   Substance and Sexual Activity    Alcohol use: No    Drug use: Yes     Types: Marijuana    Sexual activity: Yes     Partners: Female     Birth control/protection: None     Social Determinants of  Health     Financial Resource Strain: Medium Risk (8/21/2024)    Overall Financial Resource Strain (CARDIA)     Difficulty of Paying Living Expenses: Somewhat hard   Food Insecurity: No Food Insecurity (8/21/2024)    Hunger Vital Sign     Worried About Running Out of Food in the Last Year: Never true     Ran Out of Food in the Last Year: Never true   Transportation Needs: No Transportation Needs (8/21/2024)    PRAPARE - Transportation     Lack of Transportation (Medical): No     Lack of Transportation (Non-Medical): No   Physical Activity: Sufficiently Active (8/21/2024)    Exercise Vital Sign     Days of Exercise per Week: 7 days     Minutes of Exercise per Session: 60 min   Stress: No Stress Concern Present (8/21/2024)    [employer] [place] [employer] - Occupational Stress Questionnaire     Feeling of Stress : Only a little   Housing Stability: Low Risk  (8/21/2024)    Housing Stability Vital Sign     Unable to Pay for Housing in the Last Year: No     Homeless in the Last Year: No       FAMILY HISTORY:  No family history on file.    ALLERGIES AND MEDICATIONS: updated and reviewed.  Review of patient's allergies indicates:  No Known Allergies  Current Outpatient Medications   Medication Sig Dispense Refill    EScitalopram oxalate (LEXAPRO) 20 MG tablet Take 0.5 tablets (10 mg total) by mouth every evening for 7 days, THEN 1 tablet (20 mg total) every evening. 57 tablet 0    gabapentin (NEURONTIN) 300 MG capsule Take 1 capsule (300 mg total) by mouth 3 (three) times daily as needed (sciatic nerve pain). 90 capsule 1    urea (CARMOL) 40 % Crea Apply topically every evening. 198 g 0     No current facility-administered medications for this visit.       ROS  Review of Systems   Constitutional:  Negative for chills, fatigue and fever.   HENT:  Negative for rhinorrhea and sore throat.    Respiratory:  Negative for cough and shortness of breath.    Cardiovascular:  Negative for chest pain and palpitations.  "  Gastrointestinal:  Negative for constipation, diarrhea, nausea and vomiting.   Genitourinary:  Positive for testicular pain. Negative for dysuria.   Musculoskeletal:  Negative for joint swelling.   Skin:  Negative for rash and wound.   Neurological:  Negative for light-headedness and headaches.   Psychiatric/Behavioral:  Positive for decreased concentration and dysphoric mood. Negative for sleep disturbance. The patient is nervous/anxious.          OBJECTIVE     Physical Exam  Vitals:    08/21/24 0947   BP: 106/70   Pulse: 74   Resp: 18    Body mass index is 24.63 kg/m².  Weight: 91.8 kg (202 lb 6.1 oz)   Height: 6' 4" (193 cm)     Physical Exam  Vitals reviewed.   Constitutional:       General: He is not in acute distress.  HENT:      Right Ear: External ear normal.      Left Ear: External ear normal.      Nose: Nose normal.      Mouth/Throat:      Mouth: Mucous membranes are moist.   Eyes:      Extraocular Movements: Extraocular movements intact.      Conjunctiva/sclera: Conjunctivae normal.      Pupils: Pupils are equal, round, and reactive to light.   Pulmonary:      Effort: Pulmonary effort is normal.   Abdominal:      General: There is no distension.      Palpations: Abdomen is soft.   Musculoskeletal:         General: No swelling. Normal range of motion.      Cervical back: Normal range of motion.   Skin:     General: Skin is warm and dry.      Findings: No rash.   Neurological:      General: No focal deficit present.      Mental Status: He is alert and oriented to person, place, and time.   Psychiatric:         Mood and Affect: Mood normal.         Behavior: Behavior normal.           Health Maintenance         Date Due Completion Date    Hepatitis C Screening Never done ---    HIV Screening Never done ---    COVID-19 Vaccine (3 - 2023-24 season) 09/01/2023 4/15/2021    Influenza Vaccine (1) 09/01/2024 1/13/2016    TETANUS VACCINE 06/09/2026 6/9/2016              ASSESSMENT     28 y.o. male with     1. " Dysthymia    2. Generalized anxiety disorder    3. Annual physical exam    4. Routine screening for STI (sexually transmitted infection)    5. Callus of foot    6. Pain of right hip    7. Pain in right testicle        PLAN:     1. Dysthymia  - Uncontrolled. Start lexapro and refer for counseling. F/u 6 weeks.  - EScitalopram oxalate (LEXAPRO) 20 MG tablet; Take 0.5 tablets (10 mg total) by mouth every evening for 7 days, THEN 1 tablet (20 mg total) every evening.  Dispense: 57 tablet; Refill: 0  - Ambulatory referral/consult to Primary Care Behavioral Health (Non-Opioids); Future    2. Generalized anxiety disorder  - Uncontrolled. Start lexapro and refer for counseling. F/u 6 weeks.  - EScitalopram oxalate (LEXAPRO) 20 MG tablet; Take 0.5 tablets (10 mg total) by mouth every evening for 7 days, THEN 1 tablet (20 mg total) every evening.  Dispense: 57 tablet; Refill: 0  - Ambulatory referral/consult to Primary Care Behavioral Health (Non-Opioids); Future    3. Annual physical exam  - Discussed age and gender appropriate screenings at this visit and encouraged a healthy diet low in simple carbohydrates, and increased physical activity.  Counseled on medically appropriate vaccines based on age and current health condition.  Screening test reviewed and discussed with patient.   - Hemoglobin A1C; Future  - Lipid Panel; Future  - TSH; Future  - Comprehensive Metabolic Panel; Future  - CBC Auto Differential; Future    4. Routine screening for STI (sexually transmitted infection)  - Due, ordered.  - Hepatitis B Surface Antigen; Future  - C. trachomatis/N. gonorrhoeae by AMP DNA [facility]; Urine; Future  - Hepatitis C Antibody; Future  - HIV 1/2 Ag/Ab (4th Gen); Future  - Treponema Pallidium Antibodies IgG, IgM; Future    5. Callus of foot  - Painful. Start urea cream. F/u 6 weeks.  - urea (CARMOL) 40 % Crea; Apply topically every evening.  Dispense: 198 g; Refill: 0    6. Pain of right hip  - Worsening. Pt has failed  conservative management w/ NSAIDs and physical therapy. Order MRI. F/u pending results.  - MRI Hip Without Contrast Right; Future  - gabapentin (NEURONTIN) 300 MG capsule; Take 1 capsule (300 mg total) by mouth 3 (three) times daily as needed (sciatic nerve pain).  Dispense: 90 capsule; Refill: 1    7. Pain in right testicle  - New. Order u/s. F/u results.  - US SCROTUM AND TESTICLES WITH DOPPLER (XPD); Future      Nicolasa Salas MD  08/21/2024 9:58 AM        Follow up in about 6 weeks (around 10/2/2024).

## 2024-08-21 NOTE — PROGRESS NOTES
CHW - Initial Contact    This Community Health Worker completed the Social Determinant of Health questionnaire with  MRN [id number] in clinic today.    Pt identified barriers of most importance are: NONE   Referrals to community agencies completed with patient/caregiver consent outside of [facility] include: NONE  Referrals were put through [facility] - : NO  Support and Services: No support & services have been documented.  Other information discussed the patient needs / wants help with: NONE   Follow up required: NO  No future outreach task assigned

## 2024-08-23 ENCOUNTER — HOSPITAL ENCOUNTER (OUTPATIENT)
Dept: RADIOLOGY | Facility: HOSPITAL | Age: 29
Discharge: HOME | End: 2024-08-23
Attending: STUDENT IN AN ORGANIZED HEALTH CARE EDUCATION/TRAINING PROGRAM
Payer: MEDICAID

## 2024-08-23 DIAGNOSIS — N50.811 PAIN IN RIGHT TESTICLE: ICD-10-CM

## 2024-08-23 PROCEDURE — 76870 US EXAM SCROTUM: CPT | Mod: 26,,, | Performed by: RADIOLOGY

## 2024-08-23 PROCEDURE — 76870 US EXAM SCROTUM: CPT | Mod: TC

## 2024-09-05 ENCOUNTER — HOSPITAL ENCOUNTER (OUTPATIENT)
Dept: RADIOLOGY | Facility: HOSPITAL | Age: 29
Discharge: HOME | End: 2024-09-05
Attending: STUDENT IN AN ORGANIZED HEALTH CARE EDUCATION/TRAINING PROGRAM
Payer: MEDICAID

## 2024-09-05 DIAGNOSIS — M25.551 PAIN OF RIGHT HIP: ICD-10-CM

## 2024-09-05 PROCEDURE — 73721 MRI JNT OF LWR EXTRE W/O DYE: CPT | Mod: TC,RT

## 2024-09-05 PROCEDURE — 73721 MRI JNT OF LWR EXTRE W/O DYE: CPT | Mod: 26,RT,, | Performed by: RADIOLOGY

## 2024-10-02 ENCOUNTER — OFFICE VISIT (OUTPATIENT)
Facility: CLINIC | Age: 29
End: 2024-10-02
Payer: MEDICAID

## 2024-10-02 VITALS
TEMPERATURE: 98 F | WEIGHT: 203.63 LBS | OXYGEN SATURATION: 98 % | DIASTOLIC BLOOD PRESSURE: 75 MMHG | RESPIRATION RATE: 18 BRPM | BODY MASS INDEX: 24.8 KG/M2 | SYSTOLIC BLOOD PRESSURE: 100 MMHG | HEART RATE: 77 BPM | HEIGHT: 76 IN

## 2024-10-02 DIAGNOSIS — F34.1 DYSTHYMIA: ICD-10-CM

## 2024-10-02 DIAGNOSIS — F41.1 GENERALIZED ANXIETY DISORDER: ICD-10-CM

## 2024-10-02 DIAGNOSIS — M25.551 PAIN OF RIGHT HIP: Primary | ICD-10-CM

## 2024-10-02 PROCEDURE — 3044F HG A1C LEVEL LT 7.0%: CPT | Mod: CPTII,,, | Performed by: STUDENT IN AN ORGANIZED HEALTH CARE EDUCATION/TRAINING PROGRAM

## 2024-10-02 PROCEDURE — 1159F MED LIST DOCD IN RCRD: CPT | Mod: CPTII,,, | Performed by: STUDENT IN AN ORGANIZED HEALTH CARE EDUCATION/TRAINING PROGRAM

## 2024-10-02 PROCEDURE — 3008F BODY MASS INDEX DOCD: CPT | Mod: CPTII,,, | Performed by: STUDENT IN AN ORGANIZED HEALTH CARE EDUCATION/TRAINING PROGRAM

## 2024-10-02 PROCEDURE — 3078F DIAST BP <80 MM HG: CPT | Mod: CPTII,,, | Performed by: STUDENT IN AN ORGANIZED HEALTH CARE EDUCATION/TRAINING PROGRAM

## 2024-10-02 PROCEDURE — 99999 PR PBB SHADOW E&M-EST. PATIENT-LVL V: CPT | Mod: PBBFAC,,, | Performed by: STUDENT IN AN ORGANIZED HEALTH CARE EDUCATION/TRAINING PROGRAM

## 2024-10-02 PROCEDURE — 3074F SYST BP LT 130 MM HG: CPT | Mod: CPTII,,, | Performed by: STUDENT IN AN ORGANIZED HEALTH CARE EDUCATION/TRAINING PROGRAM

## 2024-10-02 PROCEDURE — 1160F RVW MEDS BY RX/DR IN RCRD: CPT | Mod: CPTII,,, | Performed by: STUDENT IN AN ORGANIZED HEALTH CARE EDUCATION/TRAINING PROGRAM

## 2024-10-02 PROCEDURE — 99215 OFFICE O/P EST HI 40 MIN: CPT | Mod: PBBFAC,PN | Performed by: STUDENT IN AN ORGANIZED HEALTH CARE EDUCATION/TRAINING PROGRAM

## 2024-10-02 PROCEDURE — 99214 OFFICE O/P EST MOD 30 MIN: CPT | Mod: S$PBB,,, | Performed by: STUDENT IN AN ORGANIZED HEALTH CARE EDUCATION/TRAINING PROGRAM

## 2024-10-02 NOTE — PROGRESS NOTES
SUBJECTIVE     Chief Complaint   Patient presents with    Follow-up    Sciatica     Pt stated he gets this pain that start from his groin and goes down to his toes       HIP PAIN:  He reports persistent and frequent hip pain that is present all day, every day. The pain has not improved with medication attempts.    MENTAL HEALTH:  He reports improved mood despite discontinuing Lexapro (escitalopram) approximately two weeks ago. He describes his depression as occurring randomly. He had previous contact with [facility] for counseling, but no in-person therapy sessions have been established. [facility] had called him and asked some questions, but they never set up actual counseling appointments.    MEDICATION HISTORY:  He briefly took gabapentin but discontinued it. He also discontinued Lexapro (escitalopram) approximately two weeks ago.    SOCIAL HISTORY:  He resides in [facility].      ROS:  General: -fever, -chills, -fatigue, -weight gain, -weight loss  Eyes: -vision changes, -redness, -discharge  ENT: -ear pain, -nasal congestion, -sore throat  Cardiovascular: -chest pain, -palpitations, -lower extremity edema  Respiratory: -cough, -shortness of breath  Gastrointestinal: -abdominal pain, -nausea, -vomiting, -diarrhea, -constipation, -blood in stool  Genitourinary: -dysuria, -hematuria, -frequency  Musculoskeletal: +joint pain, -muscle pain  Skin: -rash, -lesion  Neurological: -headache, -dizziness, -numbness, -tingling  Psychiatric: -anxiety, +depression, -sleep difficulty           PAST MEDICAL HISTORY:  History reviewed. No pertinent past medical history.    ALLERGIES AND MEDICATIONS: updated and reviewed.  Review of patient's allergies indicates:  No Known Allergies  Current Outpatient Medications   Medication Sig Dispense Refill    gabapentin (NEURONTIN) 300 MG capsule Take 1 capsule (300 mg total) by mouth 3 (three) times daily as needed (sciatic nerve pain). (Patient not taking: Reported on 10/2/2024) 90 capsule 1  "    No current facility-administered medications for this visit.         OBJECTIVE     Physical Exam  Vitals:    10/02/24 1001   BP: 100/75   Pulse: 77   Resp: 18   Temp: 97.9 °F (36.6 °C)    Body mass index is 24.78 kg/m².  Weight: 92.4 kg (203 lb 9.5 oz)   Height: 6' 4" (193 cm)     Physical Exam  Vitals reviewed.   Constitutional:       General: He is not in acute distress.  HENT:      Right Ear: External ear normal.      Left Ear: External ear normal.      Nose: Nose normal.      Mouth/Throat:      Mouth: Mucous membranes are moist.   Eyes:      Extraocular Movements: Extraocular movements intact.      Conjunctiva/sclera: Conjunctivae normal.      Pupils: Pupils are equal, round, and reactive to light.   Pulmonary:      Effort: Pulmonary effort is normal.   Abdominal:      General: There is no distension.      Palpations: Abdomen is soft.   Musculoskeletal:         General: Tenderness present. No swelling. Normal range of motion.      Cervical back: Normal range of motion.   Skin:     General: Skin is warm and dry.      Findings: No rash.   Neurological:      General: No focal deficit present.      Mental Status: He is alert and oriented to person, place, and time.   Psychiatric:         Mood and Affect: Mood normal.         Behavior: Behavior normal.           Health Maintenance         Date Due Completion Date    Pneumococcal Vaccines (Age 0-64) (1 of 2 - PCV) Never done ---    Influenza Vaccine (1) 09/01/2024 1/13/2016    COVID-19 Vaccine (3 - 2024-25 season) 09/01/2024 4/15/2021    TETANUS VACCINE 06/09/2026 6/9/2016    RSV Vaccine (Age 60+ and Pregnant patients) (1 - 1-dose 75+ series) 12/12/2072 ---              ASSESSMENT     28 y.o. male with     1. Pain of right hip    2. Generalized anxiety disorder    3. Dysthymia      Assessed ongoing hip pain, which has not improved  Considered physical therapy and sports medicine referral for comprehensive management of hip pain  Evaluated patient's mood and " depression management, noting patient discontinued escitalopram independently  Determined patient no longer requires depression medication at this time, but advised on potential for recurrence    PLAN:     1. Pain of right hip  - Referred to physical therapy for hip pain management.  - Referred to sports medicine for further evaluation of hip pain if not resolved after physical therapy.  - [facility] to call to schedule physical therapy appointments.  - Ambulatory referral/consult to Sports Medicine; Future  - Ambulatory referral/consult to Physical/Occupational Therapy; Future    2. Generalized anxiety disorder  - [facility] to call [facility] and [facility] to set up in-person therapy sessions.  - Referred to [facility] for in-person therapy sessions.  - Provided referral information for [facility].    3. Dysthymia  - Discontinued escitalopram (Lexapro) as patient reported stopping it independently and feeling better.  - Advised patient can restart escitalopram if depression symptoms return.      FOLLOW UP:  - Follow up in 3 months to assess progress with physical therapy, sports medicine evaluation, and overall improvement.         Nicolasa Salas MD  10/02/2024 10:58 AM      This note was generated with the assistance of ambient listening technology. Verbal consent was obtained by the patient and accompanying visitor(s) for the recording of patient appointment to facilitate this note. I attest to having reviewed and edited the generated note for accuracy, though some syntax or spelling errors may persist. Please contact the author of this note for any clarification.

## 2025-01-06 ENCOUNTER — OFFICE VISIT (OUTPATIENT)
Facility: CLINIC | Age: 30
End: 2025-01-06
Payer: MEDICAID

## 2025-01-06 ENCOUNTER — LAB VISIT (OUTPATIENT)
Dept: LAB | Facility: HOSPITAL | Age: 30
End: 2025-01-06
Attending: STUDENT IN AN ORGANIZED HEALTH CARE EDUCATION/TRAINING PROGRAM
Payer: MEDICAID

## 2025-01-06 VITALS
SYSTOLIC BLOOD PRESSURE: 122 MMHG | HEIGHT: 76 IN | WEIGHT: 210.75 LBS | TEMPERATURE: 98 F | OXYGEN SATURATION: 96 % | RESPIRATION RATE: 18 BRPM | HEART RATE: 71 BPM | DIASTOLIC BLOOD PRESSURE: 74 MMHG | BODY MASS INDEX: 25.66 KG/M2

## 2025-01-06 DIAGNOSIS — F41.1 GENERALIZED ANXIETY DISORDER: Primary | ICD-10-CM

## 2025-01-06 DIAGNOSIS — L84 CALLUS OF FOOT: ICD-10-CM

## 2025-01-06 DIAGNOSIS — M25.551 PAIN OF RIGHT HIP: ICD-10-CM

## 2025-01-06 DIAGNOSIS — F41.1 GENERALIZED ANXIETY DISORDER: ICD-10-CM

## 2025-01-06 LAB
BASOPHILS # BLD AUTO: 0.04 K/UL (ref 0–0.2)
BASOPHILS NFR BLD: 0.7 % (ref 0–1.9)
CCP AB SER IA-ACNC: <0.5 U/ML
CRP SERPL-MCNC: 0.4 MG/L (ref 0–8.2)
DIFFERENTIAL METHOD BLD: ABNORMAL
EOSINOPHIL # BLD AUTO: 0.1 K/UL (ref 0–0.5)
EOSINOPHIL NFR BLD: 1.5 % (ref 0–8)
ERYTHROCYTE [DISTWIDTH] IN BLOOD BY AUTOMATED COUNT: 13.2 % (ref 11.5–14.5)
ERYTHROCYTE [SEDIMENTATION RATE] IN BLOOD BY PHOTOMETRIC METHOD: 3 MM/HR (ref 0–23)
HCT VFR BLD AUTO: 49.2 % (ref 40–54)
HGB BLD-MCNC: 15.2 G/DL (ref 14–18)
IMM GRANULOCYTES # BLD AUTO: 0.02 K/UL (ref 0–0.04)
IMM GRANULOCYTES NFR BLD AUTO: 0.4 % (ref 0–0.5)
LYMPHOCYTES # BLD AUTO: 2.2 K/UL (ref 1–4.8)
LYMPHOCYTES NFR BLD: 40.8 % (ref 18–48)
MCH RBC QN AUTO: 26.9 PG (ref 27–31)
MCHC RBC AUTO-ENTMCNC: 30.9 G/DL (ref 32–36)
MCV RBC AUTO: 87 FL (ref 82–98)
MONOCYTES # BLD AUTO: 0.5 K/UL (ref 0.3–1)
MONOCYTES NFR BLD: 9.7 % (ref 4–15)
NEUTROPHILS # BLD AUTO: 2.6 K/UL (ref 1.8–7.7)
NEUTROPHILS NFR BLD: 46.9 % (ref 38–73)
NRBC BLD-RTO: 0 /100 WBC
PLATELET # BLD AUTO: 392 K/UL (ref 150–450)
PMV BLD AUTO: 9.7 FL (ref 9.2–12.9)
RBC # BLD AUTO: 5.65 M/UL (ref 4.6–6.2)
URATE SERPL-MCNC: 4.2 MG/DL (ref 3.4–7)
WBC # BLD AUTO: 5.46 K/UL (ref 3.9–12.7)

## 2025-01-06 PROCEDURE — 1159F MED LIST DOCD IN RCRD: CPT | Mod: CPTII,,, | Performed by: STUDENT IN AN ORGANIZED HEALTH CARE EDUCATION/TRAINING PROGRAM

## 2025-01-06 PROCEDURE — 3078F DIAST BP <80 MM HG: CPT | Mod: CPTII,,, | Performed by: STUDENT IN AN ORGANIZED HEALTH CARE EDUCATION/TRAINING PROGRAM

## 2025-01-06 PROCEDURE — 86200 CCP ANTIBODY: CPT | Performed by: STUDENT IN AN ORGANIZED HEALTH CARE EDUCATION/TRAINING PROGRAM

## 2025-01-06 PROCEDURE — 99215 OFFICE O/P EST HI 40 MIN: CPT | Mod: PBBFAC,PN | Performed by: STUDENT IN AN ORGANIZED HEALTH CARE EDUCATION/TRAINING PROGRAM

## 2025-01-06 PROCEDURE — 36415 COLL VENOUS BLD VENIPUNCTURE: CPT | Performed by: STUDENT IN AN ORGANIZED HEALTH CARE EDUCATION/TRAINING PROGRAM

## 2025-01-06 PROCEDURE — 1160F RVW MEDS BY RX/DR IN RCRD: CPT | Mod: CPTII,,, | Performed by: STUDENT IN AN ORGANIZED HEALTH CARE EDUCATION/TRAINING PROGRAM

## 2025-01-06 PROCEDURE — 86140 C-REACTIVE PROTEIN: CPT | Performed by: STUDENT IN AN ORGANIZED HEALTH CARE EDUCATION/TRAINING PROGRAM

## 2025-01-06 PROCEDURE — 85025 COMPLETE CBC W/AUTO DIFF WBC: CPT | Performed by: STUDENT IN AN ORGANIZED HEALTH CARE EDUCATION/TRAINING PROGRAM

## 2025-01-06 PROCEDURE — 86431 RHEUMATOID FACTOR QUANT: CPT | Performed by: STUDENT IN AN ORGANIZED HEALTH CARE EDUCATION/TRAINING PROGRAM

## 2025-01-06 PROCEDURE — 99999 PR PBB SHADOW E&M-EST. PATIENT-LVL V: CPT | Mod: PBBFAC,,, | Performed by: STUDENT IN AN ORGANIZED HEALTH CARE EDUCATION/TRAINING PROGRAM

## 2025-01-06 PROCEDURE — 86038 ANTINUCLEAR ANTIBODIES: CPT | Performed by: STUDENT IN AN ORGANIZED HEALTH CARE EDUCATION/TRAINING PROGRAM

## 2025-01-06 PROCEDURE — 85652 RBC SED RATE AUTOMATED: CPT | Performed by: STUDENT IN AN ORGANIZED HEALTH CARE EDUCATION/TRAINING PROGRAM

## 2025-01-06 PROCEDURE — 99214 OFFICE O/P EST MOD 30 MIN: CPT | Mod: S$PBB,,, | Performed by: STUDENT IN AN ORGANIZED HEALTH CARE EDUCATION/TRAINING PROGRAM

## 2025-01-06 PROCEDURE — 3074F SYST BP LT 130 MM HG: CPT | Mod: CPTII,,, | Performed by: STUDENT IN AN ORGANIZED HEALTH CARE EDUCATION/TRAINING PROGRAM

## 2025-01-06 PROCEDURE — 84550 ASSAY OF BLOOD/URIC ACID: CPT | Performed by: STUDENT IN AN ORGANIZED HEALTH CARE EDUCATION/TRAINING PROGRAM

## 2025-01-06 PROCEDURE — 3008F BODY MASS INDEX DOCD: CPT | Mod: CPTII,,, | Performed by: STUDENT IN AN ORGANIZED HEALTH CARE EDUCATION/TRAINING PROGRAM

## 2025-01-06 RX ORDER — BUPROPION HYDROCHLORIDE 150 MG/1
150 TABLET ORAL DAILY
Qty: 30 TABLET | Refills: 11 | Status: CANCELLED | OUTPATIENT
Start: 2025-01-06 | End: 2026-01-06

## 2025-01-06 NOTE — PATIENT INSTRUCTIONS
"[phone] to schedule with podiatry and orthopedics.    Apply thin layer of [employer]inary Glycolic Acid Toner to calluses on feet at bedtime, then cover with thick moisturizer and cotton socks.    FIND A THERAPIST: Go to [employer]bill and use the "find a therapist" tool. You can enter your zip code, then you will get a list of therapists in your area. You can narrow the search by putting in your insurance or "sliding scale" to find a list of counselors near you who accept your insurance. You will see a list of pictures and descriptions to choose someone who fits with you.    [facility] - [address] [facility] [facility]  [address]  [place] [address] [phone]     [facility] [place] [address] [phone]   "

## 2025-01-06 NOTE — PROGRESS NOTES
"SUBJECTIVE     Chief Complaint   Patient presents with    Hip Pain     Follow up       History of Present Illness    CHIEF COMPLAINT:  Patient presents today for hip pain.    MUSCULOSKELETAL:  He reports hip pain radiating from the posterior hip region to the knee. He attended physical therapy sessions 3 times per week, which provided temporary relief, but the pain has recurred. He also notes progressive enlargement of his foot.    MENTAL HEALTH:  He discontinued anxiety medication due to experiencing mood swings.      ROS:  General: -fever, -chills, -fatigue, -weight gain, -weight loss  Eyes: -vision changes, -redness, -discharge  ENT: -ear pain, -nasal congestion, -sore throat  Cardiovascular: -chest pain, -palpitations, -lower extremity edema  Respiratory: -cough, -shortness of breath  Gastrointestinal: -abdominal pain, -nausea, -vomiting, -diarrhea, -constipation, -blood in stool  Genitourinary: -dysuria, -hematuria, -frequency  Musculoskeletal: +joint pain, -muscle pain, -joint stiffness  Skin: -rash, -lesion  Neurological: -headache, -dizziness, -numbness, -tingling  Psychiatric: -anxiety, -depression, -sleep difficulty           PAST MEDICAL HISTORY:  History reviewed. No pertinent past medical history.    ALLERGIES AND MEDICATIONS: updated and reviewed.  Review of patient's allergies indicates:  No Known Allergies  Current Outpatient Medications   Medication Sig Dispense Refill    gabapentin (NEURONTIN) 300 MG capsule Take 1 capsule (300 mg total) by mouth 3 (three) times daily as needed (sciatic nerve pain). (Patient not taking: Reported on 1/6/2025) 90 capsule 1     No current facility-administered medications for this visit.         OBJECTIVE     Physical Exam  Vitals:    01/06/25 0958   BP: 122/74   Pulse: 71   Resp: 18   Temp: 98.4 °F (36.9 °C)    Body mass index is 25.65 kg/m².  Weight: 95.6 kg (210 lb 12.2 oz)   Height: 6' 4" (193 cm)     Physical Exam  Vitals reviewed.   Constitutional:       " General: He is not in acute distress.  HENT:      Right Ear: External ear normal.      Left Ear: External ear normal.      Nose: Nose normal.      Mouth/Throat:      Mouth: Mucous membranes are moist.   Eyes:      Extraocular Movements: Extraocular movements intact.      Conjunctiva/sclera: Conjunctivae normal.      Pupils: Pupils are equal, round, and reactive to light.   Pulmonary:      Effort: Pulmonary effort is normal.   Abdominal:      General: There is no distension.      Palpations: Abdomen is soft.   Musculoskeletal:         General: Tenderness present. No swelling. Normal range of motion.      Cervical back: Normal range of motion.   Skin:     General: Skin is warm and dry.      Findings: No rash.   Neurological:      General: No focal deficit present.      Mental Status: He is alert and oriented to person, place, and time.   Psychiatric:         Mood and Affect: Mood normal.         Behavior: Behavior normal.           Health Maintenance         Date Due Completion Date    Pneumococcal Vaccines (Age 0-49) (1 of 2 - PCV) Never done ---    Influenza Vaccine (1) 09/01/2024 1/13/2016    COVID-19 Vaccine (3 - 2024-25 season) 09/01/2024 4/15/2021    TETANUS VACCINE 06/09/2026 6/9/2016    RSV Vaccine (Age 60+ and Pregnant patients) (1 - 1-dose 75+ series) 12/12/2072 ---              ASSESSMENT     29 y.o. male with     1. Generalized anxiety disorder    2. Callus of foot    3. Pain of right hip      - Considering referral to orthopedics for persistent hip pain unresolved by physical therapy  - Ordered rheumatology testing to rule out autoimmune issues potentially causing joint pain in hip and foot  - Checking uric acid levels to exclude gout as a cause of symptoms  - Recommend podiatry evaluation for enlarging foot callus  PLAN:     1. Generalized anxiety disorder  - Noted that the patient is not taking prescribed anxiety medication due to side effects.  - Discussed the possibility of counseling as a treatment  option.  - Explained the process of finding a therapist using [employer] website.  - Instructed the patient to use [employer] website to find a therapist in [place] or [place] who accepts Medicaid and offers in-person sessions.  - CBC Auto Differential; Future    2. Callus of foot  - Visually assessed the callus on the patient's foot, noting it is getting larger.  - Considered the possibility of underlying conditions causing foot issues.  - Discussed the use of glycolic acid toner for callus management.  - Prescribed glycolic acid toner: Apply to foot callus at bedtime, followed by moisturizer and sock.  - Referred the patient to podiatry for foot callus assessment.  - Ambulatory referral/consult to Podiatry; Future  - CBC Auto Differential; Future  - C-Reactive Protein; Future  - Sedimentation rate; Future  - Rheumatoid Factor; Future  - Cyclic Citrullinated Peptide Antibody, IgG; Future  - RIP Screen w/Reflex; Future  - URIC ACID; Future    3. Pain of right hip  - Evaluated the patient's ongoing hip pain that has returned after physical therapy.  - Noted that the patient attended physical therapy 3 times per week, which provided temporary relief.  - Noted the patient's report of pain extending from hip to knee.  - Referred the patient to orthopedics for further evaluation and treatment.  - Ordered rheumatology testing to rule out autoimmune issues.  - Ordered rheumatology testing to rule out autoimmune issues affecting joints.  - Ambulatory referral/consult to Orthopedics; Future  - CBC Auto Differential; Future  - C-Reactive Protein; Future  - Sedimentation rate; Future  - Rheumatoid Factor; Future  - Cyclic Citrullinated Peptide Antibody, IgG; Future  - RIP Screen w/Reflex; Future  - URIC ACID; Future    FOLLOW UP:  - Scheduled a follow-up visit in 2 months.    Nicolasa Salas MD  01/06/2025 10:10 AM      This note was generated with the assistance of ambient listening technology. Verbal consent was  obtained by the patient and accompanying visitor(s) for the recording of patient appointment to facilitate this note. I attest to having reviewed and edited the generated note for accuracy, though some syntax or spelling errors may persist. Please contact the author of this note for any clarification.

## 2025-01-07 LAB
ANA SER QL IF: NORMAL
RHEUMATOID FACT SERPL-ACNC: <13 IU/ML (ref 0–15)

## 2025-03-06 ENCOUNTER — OFFICE VISIT (OUTPATIENT)
Facility: CLINIC | Age: 30
End: 2025-03-06
Payer: MEDICAID

## 2025-03-06 ENCOUNTER — LAB VISIT (OUTPATIENT)
Dept: LAB | Facility: HOSPITAL | Age: 30
End: 2025-03-06
Attending: STUDENT IN AN ORGANIZED HEALTH CARE EDUCATION/TRAINING PROGRAM
Payer: MEDICAID

## 2025-03-06 VITALS
TEMPERATURE: 98 F | OXYGEN SATURATION: 97 % | WEIGHT: 208 LBS | DIASTOLIC BLOOD PRESSURE: 80 MMHG | BODY MASS INDEX: 25.33 KG/M2 | HEIGHT: 76 IN | SYSTOLIC BLOOD PRESSURE: 122 MMHG | HEART RATE: 79 BPM | RESPIRATION RATE: 18 BRPM

## 2025-03-06 DIAGNOSIS — N50.811 PAIN IN RIGHT TESTICLE: ICD-10-CM

## 2025-03-06 DIAGNOSIS — F41.1 GENERALIZED ANXIETY DISORDER: Primary | ICD-10-CM

## 2025-03-06 DIAGNOSIS — Z11.3 ROUTINE SCREENING FOR STI (SEXUALLY TRANSMITTED INFECTION): ICD-10-CM

## 2025-03-06 DIAGNOSIS — M25.551 PAIN OF RIGHT HIP: ICD-10-CM

## 2025-03-06 LAB
HBV SURFACE AG SERPL QL IA: NORMAL
HCV AB SERPL QL IA: NORMAL
HIV 1+2 AB+HIV1 P24 AG SERPL QL IA: NORMAL
TREPONEMA PALLIDUM IGG+IGM AB [PRESENCE] IN SERUM OR PLASMA BY IMMUNOASSAY: NONREACTIVE

## 2025-03-06 PROCEDURE — 3008F BODY MASS INDEX DOCD: CPT | Mod: CPTII,,, | Performed by: STUDENT IN AN ORGANIZED HEALTH CARE EDUCATION/TRAINING PROGRAM

## 2025-03-06 PROCEDURE — 1159F MED LIST DOCD IN RCRD: CPT | Mod: CPTII,,, | Performed by: STUDENT IN AN ORGANIZED HEALTH CARE EDUCATION/TRAINING PROGRAM

## 2025-03-06 PROCEDURE — 1160F RVW MEDS BY RX/DR IN RCRD: CPT | Mod: CPTII,,, | Performed by: STUDENT IN AN ORGANIZED HEALTH CARE EDUCATION/TRAINING PROGRAM

## 2025-03-06 PROCEDURE — 87340 HEPATITIS B SURFACE AG IA: CPT | Performed by: STUDENT IN AN ORGANIZED HEALTH CARE EDUCATION/TRAINING PROGRAM

## 2025-03-06 PROCEDURE — 99215 OFFICE O/P EST HI 40 MIN: CPT | Mod: PBBFAC,PN | Performed by: STUDENT IN AN ORGANIZED HEALTH CARE EDUCATION/TRAINING PROGRAM

## 2025-03-06 PROCEDURE — 99214 OFFICE O/P EST MOD 30 MIN: CPT | Mod: S$PBB,,, | Performed by: STUDENT IN AN ORGANIZED HEALTH CARE EDUCATION/TRAINING PROGRAM

## 2025-03-06 PROCEDURE — 99999 PR PBB SHADOW E&M-EST. PATIENT-LVL V: CPT | Mod: PBBFAC,,, | Performed by: STUDENT IN AN ORGANIZED HEALTH CARE EDUCATION/TRAINING PROGRAM

## 2025-03-06 PROCEDURE — 87389 HIV-1 AG W/HIV-1&-2 AB AG IA: CPT | Performed by: STUDENT IN AN ORGANIZED HEALTH CARE EDUCATION/TRAINING PROGRAM

## 2025-03-06 PROCEDURE — 3074F SYST BP LT 130 MM HG: CPT | Mod: CPTII,,, | Performed by: STUDENT IN AN ORGANIZED HEALTH CARE EDUCATION/TRAINING PROGRAM

## 2025-03-06 PROCEDURE — 36415 COLL VENOUS BLD VENIPUNCTURE: CPT | Performed by: STUDENT IN AN ORGANIZED HEALTH CARE EDUCATION/TRAINING PROGRAM

## 2025-03-06 PROCEDURE — 86593 SYPHILIS TEST NON-TREP QUANT: CPT | Performed by: STUDENT IN AN ORGANIZED HEALTH CARE EDUCATION/TRAINING PROGRAM

## 2025-03-06 PROCEDURE — 3079F DIAST BP 80-89 MM HG: CPT | Mod: CPTII,,, | Performed by: STUDENT IN AN ORGANIZED HEALTH CARE EDUCATION/TRAINING PROGRAM

## 2025-03-06 PROCEDURE — 86803 HEPATITIS C AB TEST: CPT | Performed by: STUDENT IN AN ORGANIZED HEALTH CARE EDUCATION/TRAINING PROGRAM

## 2025-03-06 RX ORDER — MELOXICAM 15 MG/1
15 TABLET ORAL DAILY PRN
Qty: 30 TABLET | Refills: 11 | Status: SHIPPED | OUTPATIENT
Start: 2025-03-06 | End: 2026-03-06

## 2025-03-06 NOTE — PROGRESS NOTES
SUBJECTIVE     Chief Complaint   Patient presents with    Follow-up     Pt states he is coming in for a f/u and wants a check up        History of Present Illness    CHIEF COMPLAINT:  Patient presents today for follow up.    MUSCULOSKELETAL:  He reports persistent hip pain for the past 2 years with multiple appointment delays. Gabapentin is not providing pain relief. Previous physical therapy was ineffective in managing symptoms. MRI showed normal results with no tears. Rheumatology tests were normal.    GENITOURINARY:  He reports right testicular pain and a small cyst on the left testicle. Previous ultrasound revealed a small right-sided hydrocele and left-sided varicocele.    SEXUAL HEALTH:  He reports engaging in unprotected sexual activity.    MENTAL HEALTH:  He reports difficulty establishing care with a therapist due to appointments being repeatedly rescheduled. He expresses preference for in-person therapy sessions over virtual appointments.      ROS:  General: -fever, -chills, -fatigue, -weight gain, -weight loss  Eyes: -vision changes, -redness, -discharge  ENT: -ear pain, -nasal congestion, -sore throat  Cardiovascular: -chest pain, -palpitations, -lower extremity edema  Respiratory: -cough, -shortness of breath  Gastrointestinal: -abdominal pain, -nausea, -vomiting, -diarrhea, -constipation, -blood in stool  Genitourinary: -dysuria, -hematuria, -frequency  Musculoskeletal: -joint pain, -muscle pain  Skin: -rash, -lesion  Neurological: -headache, -dizziness, -numbness, -tingling  Psychiatric: -anxiety, -depression, -sleep difficulty           PAST MEDICAL HISTORY:  History reviewed. No pertinent past medical history.    ALLERGIES AND MEDICATIONS: updated and reviewed.  Review of patient's allergies indicates:  No Known Allergies  Current Medications[1]      OBJECTIVE     Physical Exam  Vitals:    03/06/25 1025   BP: 122/80   Pulse: 79   Resp: 18   Temp: 98 °F (36.7 °C)    Body mass index is 25.32  "kg/m².  Weight: 94.3 kg (208 lb 0.1 oz)   Height: 6' 4" (193 cm)     Physical Exam  Vitals reviewed.   Constitutional:       General: He is not in acute distress.  HENT:      Right Ear: External ear normal.      Left Ear: External ear normal.      Nose: Nose normal.      Mouth/Throat:      Mouth: Mucous membranes are moist.   Eyes:      Extraocular Movements: Extraocular movements intact.      Conjunctiva/sclera: Conjunctivae normal.      Pupils: Pupils are equal, round, and reactive to light.   Pulmonary:      Effort: Pulmonary effort is normal.   Abdominal:      General: There is no distension.      Palpations: Abdomen is soft.   Musculoskeletal:         General: No swelling. Normal range of motion.      Cervical back: Normal range of motion.   Skin:     General: Skin is warm and dry.      Findings: No rash.   Neurological:      General: No focal deficit present.      Mental Status: He is alert and oriented to person, place, and time.   Psychiatric:         Mood and Affect: Mood normal.         Behavior: Behavior normal.           Health Maintenance         Date Due Completion Date    Pneumococcal Vaccines (Age 0-49) (1 of 2 - PCV) Never done ---    Influenza Vaccine (1) 09/01/2024 1/13/2016    COVID-19 Vaccine (3 - 2024-25 season) 09/01/2024 4/15/2021    TETANUS VACCINE 06/09/2026 6/9/2016    RSV Vaccine (Age 60+ and Pregnant patients) (1 - 1-dose 75+ series) 12/12/2072 ---              ASSESSMENT     29 y.o. male with     1. Generalized anxiety disorder    2. Pain of right hip    3. Routine screening for STI (sexually transmitted infection)    4. Pain in right testicle      - Reviewed MRI results: normal, no tears or significant abnormalities  - Rheumatology workup: all results normal  - Assessed testicular ultrasound: small hydrocele on right, varicocele on left; benign findings but may be causing pain  - Considered sports medicine evaluation for potential injections to address hip pain  - Gabapentin " ineffective for hip pain; considering alternative pain management options  - Physical therapy previously attempted without significant improvement  PLAN:     1. Generalized anxiety disorder  - refer to therapy  - Ambulatory referral/consult to Primary Care Behavioral Health (Non-Opioids); Future    2. Pain of right hip  - Discontinued gabapentin due to ineffectiveness for hip pain.  - Prescribed Mobic (meloxicam) for pain management, to be taken in the morning as needed.  - Referred the patient to sports medicine for hip evaluation and potential injections.  - Advised the patient to contact sports medicine department to schedule an earlier appointment if possible (phone number provided).  - Noted that the patient reports ongoing hip pain, which remains unchanged.  - Reviewed MRI results of the hip, which were normal, showing no tears or significant issues.  - Confirmed that rheumatology tests were also normal.  - Acknowledged that physical therapy was not effective for the hip pain.  - Ambulatory referral/consult to Sports Medicine; Future  - meloxicam (MOBIC) 15 MG tablet; Take 1 tablet (15 mg total) by mouth daily as needed for Pain (hip pain).  Dispense: 30 tablet; Refill: 11    3. Routine screening for STI (sexually transmitted infection)  - Ordered STD testing to be performed today.  - Noted that the patient reports engaging in unprotected sexual intercourse.  - Acknowledged the need for STD testing due to unprotected sexual activity.       - Hepatitis B Surface Antigen; Future  - C. trachomatis/N. gonorrhoeae by AMP DNA [facility]; Urine; Future  - Hepatitis C Antibody; Future  - HIV 1/2 Ag/Ab (4th Gen); Future  - Treponema Pallidium Antibodies IgG, IgM; Future    4. Pain in right testicle  - Referred the patient to urology for evaluation of testicular pain and review of previous ultrasound.  - Noted that the patient reports pain in the right testicle.  - Reviewed previous ultrasound results, which revealed a  small hydrocele on the right side.  - Acknowledged the need for further evaluation of the testicular pain.  - Ambulatory referral/consult to Urology; Future    HYDROCELE:  - Reviewed previous ultrasound results, which revealed a small hydrocele on the right side.  - Acknowledged that the hydrocele is a benign condition but may be causing pain.  - Referred the patient to urology for further evaluation of the hydrocele and its potential relation to pain.    VARICOCELE:  - Reviewed previous ultrasound results, which revealed a small varicocele on the left side.  - Acknowledged that the varicocele is a benign condition but may be causing pain.  - Referred the patient to urology for further evaluation of the varicocele and its potential relation to pain.    TESTICULAR CYST:  - Noted that the patient reports a small cyst on the left testicle.  - Referred the patient to urology for further evaluation of the testicular cyst.      Nicolasa Salas MD  03/06/2025 10:56 AM        Follow up in about 3 months (around 6/6/2025).    This note was generated with the assistance of ambient listening technology. Verbal consent was obtained by the patient and accompanying visitor(s) for the recording of patient appointment to facilitate this note. I attest to having reviewed and edited the generated note for accuracy, though some syntax or spelling errors may persist. Please contact the author of this note for any clarification.                     [1]   Current Outpatient Medications   Medication Sig Dispense Refill    meloxicam (MOBIC) 15 MG tablet Take 1 tablet (15 mg total) by mouth daily as needed for Pain (hip pain). 30 tablet 11     No current facility-administered medications for this visit.

## 2025-03-21 ENCOUNTER — OFFICE VISIT (OUTPATIENT)
Dept: BEHAVIORAL HEALTH | Facility: CLINIC | Age: 30
End: 2025-03-21
Payer: MEDICAID

## 2025-03-21 DIAGNOSIS — F41.8 ANXIETY WITH DEPRESSION: ICD-10-CM

## 2025-03-21 PROCEDURE — 90837 PSYTX W PT 60 MINUTES: CPT | Mod: ,,,

## 2025-03-21 PROCEDURE — 99211 OFF/OP EST MAY X REQ PHY/QHP: CPT | Mod: PBBFAC,PN

## 2025-03-21 PROCEDURE — 99999 PR PBB SHADOW E&M-EST. PATIENT-LVL I: CPT | Mod: PBBFAC,,,
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
## 2025-04-09 ENCOUNTER — HOSPITAL ENCOUNTER (EMERGENCY)
Facility: HOSPITAL | Age: 30
Discharge: HOME | End: 2025-04-09
Attending: EMERGENCY MEDICINE
Payer: MEDICAID

## 2025-04-09 VITALS
BODY MASS INDEX: 24.96 KG/M2 | RESPIRATION RATE: 18 BRPM | TEMPERATURE: 98 F | WEIGHT: 205 LBS | HEART RATE: 89 BPM | DIASTOLIC BLOOD PRESSURE: 74 MMHG | SYSTOLIC BLOOD PRESSURE: 135 MMHG | OXYGEN SATURATION: 98 % | HEIGHT: 76 IN

## 2025-04-09 DIAGNOSIS — J06.9 UPPER RESPIRATORY TRACT INFECTION, UNSPECIFIED TYPE: Primary | ICD-10-CM

## 2025-04-09 LAB
CTP QC/QA: YES
INFLUENZA A ANTIGEN, POC: NEGATIVE
INFLUENZA B ANTIGEN, POC: NEGATIVE
POC RAPID STREP A: NEGATIVE
SARS-COV-2 RDRP RESP QL NAA+PROBE: NEGATIVE

## 2025-04-09 PROCEDURE — 87880 STREP A ASSAY W/OPTIC: CPT | Mod: ER

## 2025-04-09 PROCEDURE — 25000003 PHARM REV CODE 250: Mod: ER | Performed by: NURSE PRACTITIONER

## 2025-04-09 PROCEDURE — 87804 INFLUENZA ASSAY W/OPTIC: CPT | Mod: ER

## 2025-04-09 PROCEDURE — 87635 SARS-COV-2 COVID-19 AMP PRB: CPT | Mod: ER | Performed by: EMERGENCY MEDICINE

## 2025-04-09 PROCEDURE — 99283 EMERGENCY DEPT VISIT LOW MDM: CPT | Mod: ER

## 2025-04-09 RX ORDER — IBUPROFEN 600 MG/1
600 TABLET ORAL EVERY 6 HOURS PRN
Qty: 20 TABLET | Refills: 0 | Status: SHIPPED | OUTPATIENT
Start: 2025-04-09

## 2025-04-09 RX ORDER — BENZONATATE 100 MG/1
100 CAPSULE ORAL 3 TIMES DAILY PRN
Qty: 20 CAPSULE | Refills: 0 | Status: SHIPPED | OUTPATIENT
Start: 2025-04-09 | End: 2025-04-19

## 2025-04-09 RX ORDER — ACETAMINOPHEN 500 MG
1000 TABLET ORAL EVERY 6 HOURS PRN
Qty: 20 TABLET | Refills: 0 | Status: SHIPPED | OUTPATIENT
Start: 2025-04-09

## 2025-04-09 RX ORDER — CETIRIZINE HYDROCHLORIDE 10 MG/1
10 TABLET ORAL DAILY
Qty: 30 TABLET | Refills: 0 | Status: SHIPPED | OUTPATIENT
Start: 2025-04-09 | End: 2026-04-09

## 2025-04-09 RX ORDER — GUAIFENESIN 100 MG/5ML
100-200 LIQUID ORAL EVERY 4 HOURS PRN
Qty: 60 ML | Refills: 0 | Status: SHIPPED | OUTPATIENT
Start: 2025-04-09 | End: 2025-04-19

## 2025-04-09 RX ORDER — IBUPROFEN 600 MG/1
600 TABLET ORAL
Status: COMPLETED | OUTPATIENT
Start: 2025-04-09 | End: 2025-04-09

## 2025-04-09 RX ORDER — FLUTICASONE PROPIONATE 50 MCG
1 SPRAY, SUSPENSION (ML) NASAL 2 TIMES DAILY PRN
Qty: 15 G | Refills: 0 | Status: SHIPPED | OUTPATIENT
Start: 2025-04-09

## 2025-04-09 RX ADMIN — IBUPROFEN 600 MG: 600 TABLET ORAL at 01:04

## 2025-04-09 NOTE — DISCHARGE INSTRUCTIONS
COVID, flu, and strep are negative.    Thank you for coming to our Emergency Department today. It is important to remember that some problems or medical conditions are difficult to diagnose and may not be found during your Emergency Department visit.     Be sure to follow up with your primary care doctor and review all labs/imaging/tests that were performed during your ER visit with them. Some labs/tests may be outside of the normal range and require non-emergent follow-up and further investigation to help diagnose/exclude/prevent complications or other potentially serious medical conditions that were not addressed during your ER visit.    If you do not have a primary care doctor, you may contact the one listed on your discharge paperwork or you may also call the [facility] Appointment Desk at [phone] to schedule an appointment and establish care with one. It is important to your health that you have a primary care doctor.    Please take all medications as directed. All medications may potentially have side-effects and it is impossible to predict which medications may give you side-effects or what side-effects (if any) they will give you.. If you feel that you are having a negative effect or side-effect of any medication you should immediately stop taking them and seek medical attention. If you feel that you are having a life-threatening reaction call 911.    Return to the ER with any questions/concerns, new/concerning symptoms, worsening or failure to improve.     Do not drive, swim, climb to height, take a bath, operate heavy machinery, drink alcohol or take potentially sedating medications, sign any legal documents or make any important decisions for 24 hours if you have received any pain medications, sedatives or mood altering drugs during your ER visit or within 24 hours of taking them if they have been prescribed to you.     You can find additional resources for Dentists, hearing aids, durable  medical equipment, low cost pharmacies and other resources at [email or web address]    BELOW THIS LINE ONLY APPLIES IF YOU HAVE A COVID TEST PENDING OR IF YOU HAVE BEEN DIAGNOSED WITH COVID:  Please access [facility] to review the results of your test. Until the results of your COVID test return, you should isolate yourself so as not to potentially spread illness to others.   If your COVID test returns positive, you should isolate yourself so as not to spread illness to others. After five full days, if you are feeling better and you have not had fever for 24 hours, you can return to your typical daily activities, but you must wear a mask around others for an additional 5 days.   If your COVID test returns negative and you are either unvaccinated or more than six months out from your two-dose vaccine and are not yet boosted, you should still quarantine for 5 full days followed by strict mask use for an additional 5 full days.   If your COVID test returns negative and you have received your 2-dose initial vaccine as well as a booster, you should continue strict mask use for 10 full days after the exposure.  For all those exposed, best practice includes a test at day 5 after the exposure. This can be a home test or a test through one of the many testing centers throughout our community.   Masking is always advised to limit the spread of COVID. Cdc.gov is an excellent site to obtain the latest up to date recommendations regarding COVID and COVID testing.     CDC Testing and Quarantine Guidelines for patients with exposure to a known-positive COVID-19 person:  A close exposure is defined as anyone who has had an exposure (masked or unmasked) to a known COVID -19 positive person within 6 feet of someone for a cumulative total of 15 minutes or more over a 24-hour period.   Vaccinated and/or if you recently had a positive covid test within 90 days do NOT need to quarantine after contact with someone who had COVID-19  unless you develop symptoms.   Fully vaccinated people who have not had a positive test within 90 days, should get tested 3-5 days after their exposure, even if they don't have symptoms and wear a mask indoors in public for 14 days following exposure or until their test result is negative.      Unvaccinated and/or NOT had a positive test within 90 days and meet close exposure  You are required by CDC guidelines to quarantine for at least 5 days from time of exposure followed by 5 days of strict masking. It is recommended, but not required to test after 5 days, unless you develop symptoms, in which case you should test at that time.  If you get tested after 5 days and your test is positive, your 5 day period of isolation starts the day of the positive test.    If your exposure does not meet the above definition, you can return to your normal daily activities to include social distancing, wearing a mask and frequent handwashing.      Here is a link to guidance from the CDC:  https://www.cdc.gov/media/releases/2021/s1227-isolation-quarantine-guidance.html      [place] Dept Of Health Testing Sites:  https://ldh.la.gov/page/3934      [facility] website with testing locations and guidance:  [email or web address]

## 2025-04-09 NOTE — ED PROVIDER NOTES
Encounter Date: 4/9/2025    SCRIBE #1 NOTE: I, Em Calvin, am scribing for, and in the presence of,  Eri Osorio NP. I have scribed the following portions of the note - Other sections scribed: HPI, ROS.       History     Chief Complaint   Patient presents with    Sore Throat     Sore throat, headaches, body aches, coughing and diarrhea for 2 days.      CC: Sore throat     HPI: 29 y.o. M with no pertinent PMHx who presents to the ED with a sore throat x 2 days. He reports accompanying drowsiness, congestion, rhinorrhea, sneezing, and intermittent cough. He also reports feeling tension in his right neck, jaw, and forehead for a few weeks. He notes possible sick contacts, he had a basketball tournament last weekend. He has been taking Ibuprofen. No other exacerbating or alleviating factors. Patient denies abdominal pain, nausea, vomiting, or other associated symptoms. Denies recent antibiotics or steroid use.     The history is provided by the patient. No  was used.     Review of patient's allergies indicates:  No Known Allergies  Past Medical History:   Diagnosis Date    Anxiety with depression 3/21/2025     History reviewed. No pertinent surgical history.  No family history on file.  Social History[1]  Review of Systems   Constitutional:  Positive for fatigue. Negative for chills and fever.   HENT:  Positive for congestion, rhinorrhea, sneezing and sore throat. Negative for ear pain.    Eyes:  Negative for redness.   Respiratory:  Positive for cough. Negative for shortness of breath.    Cardiovascular:  Negative for chest pain.   Gastrointestinal:  Negative for abdominal pain, diarrhea, nausea and vomiting.   Genitourinary:  Negative for decreased urine volume, difficulty urinating, dysuria, frequency, hematuria and urgency.   Musculoskeletal:  Positive for neck stiffness. Negative for back pain and neck pain.   Skin:  Negative for rash.   Neurological:  Negative for headaches.    Psychiatric/Behavioral:  Negative for confusion.        Physical Exam     Initial Vitals [04/09/25 1216]   BP Pulse Resp Temp SpO2   135/74 89 18 98.1 °F (36.7 °C) 98 %      MAP       --         Physical Exam    Vitals reviewed.  Constitutional: He appears well-developed and well-nourished. He is not diaphoretic.  Non-toxic appearance. He does not have a sickly appearance. He does not appear ill. No distress.   HENT:   Head: Normocephalic and atraumatic.   Right Ear: Tympanic membrane, external ear and ear canal normal. No mastoid tenderness.   Left Ear: Tympanic membrane, external ear and ear canal normal. No mastoid tenderness.   Nose: Mucosal edema present. Mouth/Throat: Uvula is midline and mucous membranes are normal. Posterior oropharyngeal erythema present. No oropharyngeal exudate.   No trismus.  No difficulty opening and closing the jaw.   Eyes: Conjunctivae and EOM are normal. Pupils are equal, round, and reactive to light.   Neck:   Normal range of motion.  Cardiovascular:  Normal rate, regular rhythm, normal heart sounds and intact distal pulses.           Pulmonary/Chest: Breath sounds normal. No respiratory distress.   Abdominal: Abdomen is soft. There is no abdominal tenderness.   Musculoskeletal:         General: Normal range of motion.      Cervical back: Normal range of motion.     Neurological: He is alert and oriented to person, place, and time. GCS eye subscore is 4. GCS verbal subscore is 5. GCS motor subscore is 6.   Skin: Skin is warm, dry and intact. No rash noted. No erythema.   Psychiatric: He has a normal mood and affect. Thought content normal.         ED Course   Procedures  Labs Reviewed   SARS-COV-2 RDRP GENE       Result Value    POC Rapid COVID Negative       Acceptable Yes      Narrative:     This test utilizes isothermal nucleic acid amplification technology to detect the SARS-CoV-2 RdRp nucleic acid segment. The analytical sensitivity (limit of detection) is 500  copies/swab.     A POSITIVE result is indicative of the presence of SARS-CoV-2 RNA; clinical correlation with patient history and other diagnostic information is necessary to determine patient infection status.    A NEGATIVE result means that SARS-CoV-2 nucleic acids are not present above the limit of detection. A NEGATIVE result should be treated as presumptive. It does not rule out the possibility of COVID-19 and should not be the sole basis for treatment decisions. If COVID-19 is strongly suspected based on clinical and exposure history, re-testing using an alternate molecular assay should be considered.     Commercial kits are provided by [employer].       POCT INFLUENZA A/B MOLECULAR   POCT STREP A MOLECULAR   POCT STREP A, RAPID    POC Rapid Strep A negative     POCT RAPID INFLUENZA A/B    Influenza B Ag negative      Inflenza A Ag negative            Imaging Results    None          Medications   ibuprofen tablet 600 mg (600 mg Oral Given 4/9/25 1325)     Medical Decision Making  This is an evaluation of a 29 y.o. male that presents to the Emergency Department for URI symptoms x2 days, intermittent headache and right TMJ pain times a few weeks. The patient is a non-toxic, afebrile, and well appearing male. On physical exam ears and pharynx are without evidence of infection. Appears well hydrated with moist mucus membranes. Neck soft and supple with no meningeal signs or cervical lymphadenopathy. Breath sounds are clear and equal bilaterally with no adventitious breath sounds, tachypnea or respiratory distress with room air pulse ox of 98% and no evidence of hypoxia.     Vital Signs Are Reassuring.  RESULTS:  COVID, flu, strep negative.    My overall impression is Viral URI. I considered, but at this time, do not suspect OM, OE, strep pharyngitis, meningitis, pneumonia, or acute bacterial sinusitis.    ED return precautions were discussed and understanding was verbalized. All questions or concerns have  been addressed.     Amount and/or Complexity of Data Reviewed  Labs: ordered. Decision-making details documented in ED Course.    Risk  OTC drugs.  Prescription drug management.            Scribe Attestation:   Scribe #1: I performed the above scribed service and the documentation accurately describes the services I performed. I attest to the accuracy of the note.        ED Course as of 04/09/25 1404   Wed Apr 09, 2025   1308 POC Rapid Strep A: negative [MM]   1311 Influenza B Ag: negative [MM]   1311 Inflenza A Ag: negative [MM]   1322 SARS-CoV-2 RNA, Amplification, Qual: Negative [MM]      ED Course User Index  [MM] Eri Osorio NP I, M Mercer,  personally performed the services described in this documentation. All medical record entries made by the scribe were at my direction and in my presence. I have reviewed the chart and agree that the record reflects my personal performance and is accurate and complete.      Clinical Impression:  Final diagnoses:  [J06.9] Upper respiratory tract infection, unspecified type (Primary)          ED Disposition Condition    Discharge Stable          ED Prescriptions       Medication Sig Dispense Start Date End Date Auth. Provider    benzonatate (TESSALON) 100 MG capsule Take 1 capsule (100 mg total) by mouth 3 (three) times daily as needed for Cough. 20 capsule 4/9/2025 4/19/2025 Eri Osorio NP    cetirizine (ZYRTEC) 10 MG tablet Take 1 tablet (10 mg total) by mouth once daily. 30 tablet 4/9/2025 4/9/2026 Eri Osorio NP    fluticasone propionate (FLONASE) 50 mcg/actuation nasal spray 1 spray (50 mcg total) by Each Nostril route 2 (two) times daily as needed for Rhinitis or Allergies. 15 g 4/9/2025 -- Eri Osorio NP    ibuprofen (ADVIL,MOTRIN) 600 MG tablet Take 1 tablet (600 mg total) by mouth every 6 (six) hours as needed for Pain. 20 tablet 4/9/2025 -- Eri Osorio NP    acetaminophen (TYLENOL) 500 MG tablet Take 2 tablets (1,000 mg total) by mouth  every 6 (six) hours as needed for Pain. 20 tablet 4/9/2025 -- Eri Osorio NP    guaiFENesin 100 mg/5 ml (ROBITUSSIN) 100 mg/5 mL syrup Take 5-10 mLs (100-200 mg total) by mouth every 4 (four) hours as needed for Cough. 60 mL 4/9/2025 4/19/2025 Eri Osorio NP          Follow-up Information       Follow up With Specialties Details Why Contact Info    Nicolasa Salas MD Family Medicine Schedule an appointment as soon as possible for a visit  For follow-up [address] [place] [id number] [phone]      [facility] ED Emergency Medicine Go to  If symptoms worsen [address] [place] [id number] [phone]               [1]   Social History  Tobacco Use    Smoking status: Some Days     Types: Vaping with nicotine     Passive exposure: Never    Smokeless tobacco: Never   Substance Use Topics    Alcohol use: No    Drug use: Yes     Types: Marijuana        Eri Osorio NP  04/09/25 3345

## 2025-04-11 ENCOUNTER — RESULTS FOLLOW-UP (OUTPATIENT)
Facility: CLINIC | Age: 30
End: 2025-04-11

## 2025-04-23 ENCOUNTER — TELEPHONE (OUTPATIENT)
Dept: PODIATRY | Facility: CLINIC | Age: 30
End: 2025-04-23
Payer: MEDICAID

## 2025-04-23 NOTE — TELEPHONE ENCOUNTER
Called the pt twice no answer left a detailed vm informing him of the provider being out of the office and to call back to confirm the provider change.    Katarina KENNEDY

## 2025-06-06 ENCOUNTER — OFFICE VISIT (OUTPATIENT)
Facility: CLINIC | Age: 30
End: 2025-06-06

## 2025-06-06 ENCOUNTER — LAB VISIT (OUTPATIENT)
Dept: LAB | Facility: HOSPITAL | Age: 30
End: 2025-06-06
Attending: STUDENT IN AN ORGANIZED HEALTH CARE EDUCATION/TRAINING PROGRAM

## 2025-06-06 VITALS
OXYGEN SATURATION: 99 % | TEMPERATURE: 98 F | HEIGHT: 76 IN | SYSTOLIC BLOOD PRESSURE: 112 MMHG | HEART RATE: 78 BPM | RESPIRATION RATE: 18 BRPM | WEIGHT: 206 LBS | BODY MASS INDEX: 25.09 KG/M2 | DIASTOLIC BLOOD PRESSURE: 70 MMHG

## 2025-06-06 DIAGNOSIS — Z20.2 EXPOSURE TO STD: ICD-10-CM

## 2025-06-06 DIAGNOSIS — F41.1 GENERALIZED ANXIETY DISORDER: Primary | ICD-10-CM

## 2025-06-06 DIAGNOSIS — M25.551 PAIN OF RIGHT HIP: ICD-10-CM

## 2025-06-06 DIAGNOSIS — N50.811 PAIN IN RIGHT TESTICLE: ICD-10-CM

## 2025-06-06 LAB
HBV SURFACE AG SERPL QL IA: NORMAL
HCV AB SERPL QL IA: NORMAL
HIV 1+2 AB+HIV1 P24 AG SERPL QL IA: NORMAL
T PALLIDUM IGG+IGM SER QL: NORMAL

## 2025-06-06 PROCEDURE — 86803 HEPATITIS C AB TEST: CPT

## 2025-06-06 PROCEDURE — 86593 SYPHILIS TEST NON-TREP QUANT: CPT

## 2025-06-06 PROCEDURE — 99215 OFFICE O/P EST HI 40 MIN: CPT | Mod: PBBFAC,PN | Performed by: STUDENT IN AN ORGANIZED HEALTH CARE EDUCATION/TRAINING PROGRAM

## 2025-06-06 PROCEDURE — 87491 CHLMYD TRACH DNA AMP PROBE: CPT

## 2025-06-06 PROCEDURE — 87340 HEPATITIS B SURFACE AG IA: CPT

## 2025-06-06 PROCEDURE — 87389 HIV-1 AG W/HIV-1&-2 AB AG IA: CPT

## 2025-06-06 PROCEDURE — 99999 PR PBB SHADOW E&M-EST. PATIENT-LVL V: CPT | Mod: PBBFAC,,, | Performed by: STUDENT IN AN ORGANIZED HEALTH CARE EDUCATION/TRAINING PROGRAM

## 2025-06-06 PROCEDURE — 36415 COLL VENOUS BLD VENIPUNCTURE: CPT | Mod: PN

## 2025-06-09 ENCOUNTER — RESULTS FOLLOW-UP (OUTPATIENT)
Facility: CLINIC | Age: 30
End: 2025-06-09

## 2025-06-09 LAB
C TRACH DNA SPEC QL NAA+PROBE: NOT DETECTED
CTGC SOURCE (OHS) ORD-325: NORMAL
N GONORRHOEA DNA UR QL NAA+PROBE: NOT DETECTED

## 2025-07-21 ENCOUNTER — HOSPITAL ENCOUNTER (EMERGENCY)
Facility: HOSPITAL | Age: 30
Discharge: HOME | End: 2025-07-21
Attending: STUDENT IN AN ORGANIZED HEALTH CARE EDUCATION/TRAINING PROGRAM
Payer: MEDICAID

## 2025-07-21 VITALS
RESPIRATION RATE: 18 BRPM | WEIGHT: 206 LBS | BODY MASS INDEX: 25.09 KG/M2 | HEIGHT: 76 IN | HEART RATE: 92 BPM | DIASTOLIC BLOOD PRESSURE: 67 MMHG | TEMPERATURE: 97 F | OXYGEN SATURATION: 99 % | SYSTOLIC BLOOD PRESSURE: 120 MMHG

## 2025-07-21 DIAGNOSIS — S00.11XA CONTUSION OF RIGHT EYEBROW, INITIAL ENCOUNTER: Primary | ICD-10-CM

## 2025-07-21 PROCEDURE — 25000003 PHARM REV CODE 250: Mod: ER

## 2025-07-21 PROCEDURE — 99283 EMERGENCY DEPT VISIT LOW MDM: CPT | Mod: ER

## 2025-07-21 RX ORDER — IBUPROFEN 600 MG/1
600 TABLET, FILM COATED ORAL EVERY 6 HOURS PRN
Qty: 20 TABLET | Refills: 0 | Status: SHIPPED | OUTPATIENT
Start: 2025-07-21

## 2025-07-21 RX ORDER — PROPARACAINE HYDROCHLORIDE 5 MG/ML
1 SOLUTION/ DROPS OPHTHALMIC
Status: COMPLETED | OUTPATIENT
Start: 2025-07-21 | End: 2025-07-21

## 2025-07-21 RX ORDER — FLUORESCEIN SODIUM 1 MG/MG
1 STRIP OPHTHALMIC
Status: COMPLETED | OUTPATIENT
Start: 2025-07-21 | End: 2025-07-21

## 2025-07-21 RX ORDER — ACETAMINOPHEN 500 MG
500 TABLET ORAL EVERY 6 HOURS PRN
Qty: 30 TABLET | Refills: 0 | Status: SHIPPED | OUTPATIENT
Start: 2025-07-21

## 2025-07-21 RX ADMIN — FLUORESCEIN SODIUM 1 EACH: 1 STRIP OPHTHALMIC at 09:07

## 2025-07-21 RX ADMIN — PROPARACAINE HYDROCHLORIDE 1 DROP: 5 SOLUTION/ DROPS OPHTHALMIC at 09:07

## 2025-07-22 ENCOUNTER — HOSPITAL ENCOUNTER (EMERGENCY)
Facility: HOSPITAL | Age: 30
Discharge: HOME | End: 2025-07-22
Attending: EMERGENCY MEDICINE
Payer: MEDICAID

## 2025-07-22 VITALS
TEMPERATURE: 98 F | DIASTOLIC BLOOD PRESSURE: 79 MMHG | OXYGEN SATURATION: 98 % | HEART RATE: 60 BPM | HEIGHT: 75 IN | WEIGHT: 215 LBS | SYSTOLIC BLOOD PRESSURE: 126 MMHG | RESPIRATION RATE: 18 BRPM | BODY MASS INDEX: 26.73 KG/M2

## 2025-07-22 DIAGNOSIS — S09.90XA CHI (CLOSED HEAD INJURY), INITIAL ENCOUNTER: Primary | ICD-10-CM

## 2025-07-22 DIAGNOSIS — R51.9 NONINTRACTABLE HEADACHE, UNSPECIFIED CHRONICITY PATTERN, UNSPECIFIED HEADACHE TYPE: ICD-10-CM

## 2025-07-22 PROCEDURE — 99284 EMERGENCY DEPT VISIT MOD MDM: CPT | Mod: 25,ER

## 2025-07-22 NOTE — DISCHARGE INSTRUCTIONS
Thank you for coming to our Emergency Department today. It is important to remember that some problems or medical conditions are difficult to diagnose and may not be found or addressed during your Emergency Department visit.  These conditions often start with non-specific symptoms and can only be diagnosed on follow up visits with your primary care physician or specialist when the symptoms continue or change. Please remember that all medical conditions can change, and we cannot predict how you will be feeling tomorrow or the next day. Return to the ER with any questions/concerns, new/concerning symptoms including fever, chest pain, shortness of breath, loss of consciousness, dizziness, weakness, worsening symptoms, failure to improve, or any other concerns. Also, please follow up with your Primary Care Physician and/or Pediatrician in the next 1-2 days to review your ED visit in entirety and for re-evaluation.   Be sure to follow up with your primary care doctor and review all labs/imaging/tests that were performed during your ER visit with them. It is very common for us to identify non-emergent incidental findings which must be followed up with your primary care physician.  Some labs/imaging/tests may be outside of the normal range, and require non-emergent follow-up and/or further investigation/treatment/procedures/testing to help diagnose/exclude/prevent complications or other potentially serious medical conditions. Some abnormalities may not have been discussed or addressed during your ER visit. Some lab results may not return during your ER visit but can be accessible by downloading the free [facility] riri or by visiting [email or web address] . It is important for you to review all labs/imaging/tests which are outside of the normal range with your physician.  An ER visit does not replace a primary care visit, and many screening tests or follow-up tests cannot be ordered by an ER doctor or performed by  the ER. Some tests may even require pre-approval.  If you do not have a primary care doctor, you may contact the one listed on your discharge paperwork or you may also call the [facility] Appointment Desk at [phone] , or [address] [place] [address] at  [phone] to schedule an appointment, or establish care with a primary care doctor or even a specialist and to obtain information about local resources. It is important to your health that you have a primary care doctor.  Please take all medications as directed. We have done our best to select a medication for you that will treat your condition however, all medications may potentially have side-effects and it is impossible to predict which medications may give you side-effects or what those side-effects (if any) those medications may give you.  If you feel that you are having a negative effect or side-effect of any medication you should stop taking those medications immediately and seek medical attention. If you feel that you are having a life-threatening reaction call 911.  Do not drive, swim, climb to height, take a bath, operate heavy machinery, drink alcohol or take potentially sedating medications, sign any legal documents or make any important decisions for 24 hours if you have received any pain medications, sedatives or mood altering drugs during your ER visit or within 24 hours of taking them if they have been prescribed to you.   You can find additional resources for Dentists, hearing aids, durable medical equipment, low cost pharmacies and other resources at [email or web address]  Patient agrees with this discharge plan. Discussed strict return precautions, they verbalized understanding.   § Please take all medication as prescribed.

## 2025-07-22 NOTE — DISCHARGE INSTRUCTIONS
The CT scan of your head is normal.  Alternate your prescribed ibuprofen and Tylenol for pain.  See your doctor for any further concerns.

## 2025-07-22 NOTE — Clinical Note
"Ashley Ledesmafrancisco j Smallwood was seen and treated in our emergency department on 7/22/2025.  He may return to work on 07/23/2025.       If you have any questions or concerns, please don't hesitate to call.      Alanna Claire MD"

## 2025-07-22 NOTE — ED PROVIDER NOTES
Encounter Date: 7/21/2025       History     Chief Complaint   Patient presents with    Head Injury     Pt was hit in the head with a basketball around 1730 today; c/o headache and blurred vision in R eye     The history is provided by the patient and medical records.   29-year-old male with a past medical history of anxiety and depression presenting to the emergency department today for evaluation of right-sided headache and right eye pain that has occurred around 5:30 p.m. today.  Reports he was standing underneath the basketball goal when a basketball came through the goal and when he looked up hit him in the face.  Reports initially having pain on the right side of his face around the eye and states his vision was slightly blurry however that has vision has returned back to normal prior to arrival.  That has not taken any medication for his symptoms.  Patient was no other complaints at this time.  Denies any dizziness, vision loss, weakness, numbness, tingling, other associated symptoms.  Review of patient's allergies indicates:  No Known Allergies  Past Medical History:   Diagnosis Date    Anxiety with depression 3/21/2025     History reviewed. No pertinent surgical history.  No family history on file.  Social History[1]  Review of Systems   Constitutional:  Negative for chills, diaphoresis, fatigue and fever.   HENT:  Negative for congestion, ear discharge, ear pain, rhinorrhea, sore throat and trouble swallowing.    Eyes:  Positive for visual disturbance (resolved). Negative for photophobia and redness.   Respiratory:  Negative for cough and shortness of breath.    Cardiovascular:  Negative for chest pain, palpitations and leg swelling.   Gastrointestinal:  Negative for abdominal pain, diarrhea, nausea and vomiting.   Genitourinary:  Negative for difficulty urinating, dysuria, flank pain, frequency and hematuria.   Musculoskeletal:  Positive for myalgias (right forehead). Negative for arthralgias, back pain,  neck pain and neck stiffness.   Skin:  Negative for pallor and rash.   Neurological:  Positive for headaches. Negative for dizziness, weakness, light-headedness and numbness.   Hematological:  Does not bruise/bleed easily.       Physical Exam     Initial Vitals [07/21/25 1956]   BP Pulse Resp Temp SpO2   120/67 92 18 97.4 °F (36.3 °C) 99 %      MAP       --         Physical Exam    Nursing note and vitals reviewed.  Constitutional: He appears well-developed and well-nourished. He is not diaphoretic. He does not appear ill. No distress.   HENT:   Head: Normocephalic. Head is with contusion. Head is without raccoon's eyes, without Wilhelm's sign, without abrasion and without laceration. Hair is normal.       Right Ear: External ear normal.   Left Ear: External ear normal.   Nose: Nose normal. Mouth/Throat: Uvula is midline and oropharynx is clear and moist.   Small contusion to the right lateral eyebrow. Skin intact.    Eyes: Conjunctivae, EOM and lids are normal. Pupils are equal, round, and reactive to light. Lids are everted and swept, no foreign bodies found. Right eye exhibits no discharge. Left eye exhibits no discharge. No scleral icterus.   Fundoscopic exam:       The right eye shows no AV nicking, no exudate, no hemorrhage and no papilledema.        The left eye shows no AV nicking, no exudate, no hemorrhage and no papilledema.   Slit lamp exam:       The right eye shows no corneal abrasion, no corneal flare, no corneal ulcer, no foreign body, no hyphema, no hypopyon, no fluorescein uptake and no anterior chamber bulge.   Bilaterally EOMI, PERRLA.  Normal conjunctiva.    Focused exam of the right eye that has no evidence of ulceration or abrasion.  No fluorescein uptake.  Negative Vicki sign.  No hyphema or hypopyon.  No papilledema.   Neck: Trachea normal. Neck supple.   Normal range of motion.   Full passive range of motion without pain.     Cardiovascular:  Normal rate, regular rhythm, normal heart sounds,  intact distal pulses and normal pulses.     Exam reveals no distant heart sounds and no friction rub.       Pulmonary/Chest: Effort normal and breath sounds normal. No respiratory distress.   Abdominal: Abdomen is soft. Bowel sounds are normal. He exhibits no distension and no pulsatile midline mass. There is no abdominal tenderness.   No right CVA tenderness.  No left CVA tenderness. There is no rebound and no guarding.   Musculoskeletal:         General: Normal range of motion.      Right shoulder: Normal.      Left shoulder: Normal.      Right elbow: Normal.      Left elbow: Normal.      Right wrist: Normal.      Left wrist: Normal.      Right hand: Normal.      Left hand: Normal.      Cervical back: Normal, full passive range of motion without pain, normal range of motion and neck supple.      Thoracic back: Normal.      Lumbar back: Normal.      Right hip: Normal.      Left hip: Normal.      Right knee: Normal.      Left knee: Normal.      Right lower leg: Normal.      Left lower leg: Normal.      Right ankle: Normal.      Left ankle: Normal.      Right foot: Normal.      Left foot: Normal.     Neurological: He is alert and oriented to person, place, and time. He has normal strength. No cranial nerve deficit or sensory deficit. Coordination and gait normal.   He is awake, alert, and oriented x3. PERRL. EOM's Intact. Gross visual acuity is intact. No tongue deviation or slurred speech. Bilateral facial movement is symmetrical. Symmetrical shoulder shrug and head moves appropriately side to side. Sensation is intact and symmetric to face, upper, and lower extremities. Patient hears commands and appropriately responds. Strength is 5/5 UE/LE bilaterally. No truncal ataxia. Ambulates with a steady gait.  Normal finger-to-nose.  Cervical Nerve Exam Normal: Equal strength with upper extremities (thumbs up/down/side, fingers spread, wrist and elbow flexion/extension, arms crossed).      Skin: Skin is warm and dry.  Capillary refill takes less than 2 seconds. No bruising, no ecchymosis and no rash noted. No erythema.   Psychiatric: He has a normal mood and affect. His speech is normal and behavior is normal. Thought content normal.         ED Course   Procedures  Labs Reviewed - No data to display       Imaging Results    None          Medications   proparacaine 0.5 % ophthalmic solution 1 drop (1 drop Right Eye Given 7/21/25 2103)   fluorescein ophthalmic strip 1 each (1 each Right Eye Given 7/21/25 2103)     Medical Decision Making  29-year-old male with a past medical history of anxiety and depression presenting to the emergency department today for evaluation of right-sided headache and right eye pain that has occurred around 5:30 p.m. today.  Reports he was standing underneath the basketball goal when a basketball came through the goal and when he looked up hit him in the face.  Reports initially having pain on the right side of his face around the eye and states his vision was slightly blurry however that has vision has returned back to normal prior to arrival.  That has not taken any medication for his symptoms.  Patient was no other complaints at this time.  Denies any dizziness, vision loss, weakness, numbness, tingling, other associated symptoms.  Patient's chart and medical history reviewed.  Patient's vitals reviewed.  They are afebrile, no respiratory distress, nontoxic-appearing in the ED.  Differential diagnosis is considered   - SAH, Epidural hematoma, Subdural hematoma: considered with complaint, although unlikely with no LOC, no N/V, normal neurovascular exam, per Geary criteria and using shared decision making will not obtain imaging at this time due to low suspicion..  - skull fracture, facial fracture: considered with complaint although unlikely with no raccoon eyes, dow signs, evidence of CSF leakage in nose/ears bilaterally, EOMI without pain  - conjunctivitis, iritis (traumatic vs non),  subconjunctival hemorrhage, corneal abrasion, corneal ulcer, conjunctivitis (bacterial, viral, chemical), retinal detachment, vitreous hemorrhage, papilledema, periorbital/preseptal cellulitis, orbital cellulitis, lid laceration, open globe, Hordeolum.  Physical exam as noted above.   Findings consistent with soft tissue contusion of the lateral eyebrow. No ocular involvement on evaluation. Normal visual acuity. Advised applying ice over contusion and NSAIDs for discomfort.   At this time I'll discharge home to follow up with primary care physician in the next 1-2 days for further evaluation.  If the pain continues the pt will need to see Ophthalmology for further evaluation.  The patient is comfortable with this plan and comfortable going home at this time. After taking into careful account the historical factors and physical exam findings of the patient's presentation today, in conjunction with the empirical and objective data obtained on ED workup, no acute emergent medical condition has been identified. The patient appears to be low risk for an emergent medical condition and I feel it is safe and appropriate at this time for the patient to be discharged to follow-up as detailed in their discharge instructions for reevaluation and possible continued outpatient workup and management. I have discussed the specifics of the workup with the patient and the patient has verbalized understanding of the details of the workup, the diagnosis, the treatment plan, and the need for outpatient follow-up.  Although the patient has no emergent etiology today this does not preclude the development of an emergent condition so in addition, I have advised the patient that they can return to the ED and/or activate EMS at any time with worsening of their symptoms, change of their symptoms, or with any other medical complaint.  The patient remained comfortable and stable during their visit in the ED.  Discharge and follow-up  instructions discussed with the patient who expressed understanding and willingness to comply with my recommendations. I discussed with the patient/family the diagnosis, treatment plan, indications for return to the emergency department, and for expected follow-up. Please follow up with your primary doctor in 1-2 days and return to the ED in any new, worsening, or continued symptoms. The patient/family verbalized an understanding. The patient/family is asked if there are any questions or concerns. We discuss the case, until all issues are addressed to the patient/family's satisfaction. Patient/family understands and is agreeable to the plan.    LEMUEL ZUNIGA PA-C.    DISCLAIMER: [email or web address], a voice recognition system was utilized in creating the note.      Risk  OTC drugs.  Prescription drug management.                                          Clinical Impression:  Final diagnoses:  [S00.11XA] Contusion of right eyebrow, initial encounter (Primary)          ED Disposition Condition    Discharge Stable          ED Prescriptions       Medication Sig Dispense Start Date End Date Auth. Provider    acetaminophen (TYLENOL) 500 MG tablet Take 1 tablet (500 mg total) by mouth every 6 (six) hours as needed for Pain. 30 tablet 7/21/2025 -- Lemuel Zuniga PA-C    ibuprofen (ADVIL,MOTRIN) 600 MG tablet Take 1 tablet (600 mg total) by mouth every 6 (six) hours as needed for Pain. 20 tablet 7/21/2025 -- Lemuel Zuniga PA-C          Follow-up Information       Follow up With Specialties Details Why Contact Info    Nicolasa Salas MD Family Medicine Schedule an appointment as soon as possible for a visit in 1 day for follow up [address] [place] [id number] [phone]      [facility] ED Emergency Medicine Go to  If you have new or worsening symptoms, or if you have any concerns at all. [address] [facility] [address] [phone]    Your primary care physician  Schedule an  appointment as soon as possible for a visit in 1 day for follow up regarding today's visit and for re-evaluation. Please follow up in 1-2 days.                    [1]   Social History  Tobacco Use    Smoking status: Some Days     Types: Vaping with nicotine     Passive exposure: Never    Smokeless tobacco: Never   Vaping Use    Vaping status: Never Used   Substance Use Topics    Alcohol use: No    Drug use: Yes     Types: Marijuana        Lemuel Reid PA-C  07/21/25 3696

## 2025-07-22 NOTE — ED PROVIDER NOTES
"Encounter Date: 7/22/2025    SCRIBE #1 NOTE: I, Carmencita Suarez, am scribing for, and in the presence of,  Alanna Claire MD.       History     Chief Complaint   Patient presents with    Headache     Was hit in head with a basketball yesterday at 1700; begin with headache and right eye blurred vision immediately after event; vision has resolved but remains with headache today; Denies n/v and loc; states he was seen here yesterday and concerned because a head ct was not done--requesting imaging     Ashley Smallwood is a 29 y.o. male, with a past medical history of anxiety and depression, who presents to the ED with a right sided headache that began yesterday s/p injury. Patient states he was hit in the head with a basketball and inially had a headache, right eye pain, and blurry vision to the right eye. He states his symptoms have resolved except for the headache and states the pain was worse upon waking up this morning. Patient requesting head CT. Attempted treatment with prescribed Tylenol and ibuprofen without relief. Patient denies any associated loss of consciousness, nausea, or vomiting. Patient admits to vaping and smoking marijuana, denies EtOH usage. NKDA.     Per chart review, patient was seen at this facility for the same compliant yesterday:  "Small contusion to the right lateral eyebrow. Skin intact. Focused exam of the right eye that has no evidence of ulceration or abrasion.  No fluorescein uptake.  Negative Vicki sign.  No hyphema or hypopyon.  No papilledema." Patient discharged home with 500 mg Tylenol and 600 mg ibuprofen, advised to follow up with PCP.     The history is provided by the patient. No  was used.     Review of patient's allergies indicates:  No Known Allergies  Past Medical History:   Diagnosis Date    Anxiety with depression 3/21/2025     History reviewed. No pertinent surgical history.  No family history on file.  Social History[1]  Review of Systems "   Constitutional:  Negative for activity change, appetite change, chills and fever.   HENT:  Negative for congestion, rhinorrhea, sneezing and sore throat.    Eyes:  Negative for pain and visual disturbance.   Respiratory:  Negative for cough, chest tightness, shortness of breath and wheezing.    Cardiovascular:  Negative for chest pain and palpitations.   Gastrointestinal:  Negative for abdominal pain, diarrhea, nausea and vomiting.   Skin:  Negative for rash.   Neurological:  Positive for headaches. Negative for dizziness, syncope and light-headedness.   All other systems reviewed and are negative.      Physical Exam     Initial Vitals [07/22/25 0839]   BP Pulse Resp Temp SpO2   137/84 67 18 97.8 °F (36.6 °C) 99 %      MAP       --         Physical Exam    Nursing note and vitals reviewed.  Constitutional: He appears well-developed and well-nourished. No distress.   HENT:   Head: Normocephalic. Head is with contusion.   Contusion above right eye.    Eyes: Conjunctivae are normal.   Neck:   Normal range of motion.  Cardiovascular:  Normal rate and regular rhythm.           No murmur heard.  Pulmonary/Chest: Breath sounds normal. No respiratory distress.   Abdominal: Bowel sounds are normal. He exhibits no distension. There is no abdominal tenderness.   Musculoskeletal:         General: No tenderness or edema. Normal range of motion.      Cervical back: Normal range of motion.     Neurological: He is alert and oriented to person, place, and time. He has normal strength. No cranial nerve deficit or sensory deficit. Coordination and gait normal. GCS eye subscore is 4. GCS verbal subscore is 5. GCS motor subscore is 6.   Skin: Skin is warm and dry. No rash noted.   Psychiatric: He has a normal mood and affect. His behavior is normal.         ED Course   Procedures  Labs Reviewed - No data to display       Imaging Results              CT Head Without Contrast (Final result)  Result time 07/22/25 09:12:21      Final  result by Bola Weiss MD (07/22/25 09:12:21)                   Impression:      No evidence for acute intracranial hemorrhage or major vascular territory infarct.      Electronically signed by: Bola Weiss  Date:    07/22/2025  Time:    09:12               Narrative:    EXAMINATION:  CT HEAD WITHOUT CONTRAST    CLINICAL HISTORY:  Facial trauma, blunt;    TECHNIQUE:  Low dose axial CT images obtained throughout the head without intravenous contrast. Sagittal and coronal reconstructions were performed.    COMPARISON:  None.    FINDINGS:  Intracranial compartment:    Ventricles and sulci are normal in size for age without evidence of hydrocephalus. No extra-axial blood or fluid collections.    The brain parenchyma appears normal. No parenchymal mass, hemorrhage, edema or major vascular distribution infarct.    Skull/extracranial contents (limited evaluation): No fracture. Mild patchy mucosal thickening about the paranasal sinuses.  Mastoid air cells are essentially clear.                                       Medications - No data to display  Medical Decision Making  29 M with anxiety and depression, presents with right sided headache since yesterday s/p injury. On exam, patient has a contusion above to right eye. Neurologically intact. In shared decision making with the patient I will order CT head. CT shows no acute traumatic injuries. Pt was reassured. Advised to continue alternating motrin/tylenol for HA.    Amount and/or Complexity of Data Reviewed  External Data Reviewed: notes.     Details: See HPI.   Radiology: ordered. Decision-making details documented in ED Course.    Risk  Prescription drug management.  Diagnosis or treatment significantly limited by social determinants of health.            Scribe Attestation:   Scribe #1: I performed the above scribed service and the documentation accurately describes the services I performed. I attest to the accuracy of the note.                                 IDr.  Alanna Claire, personally performed the services described in this documentation.   All medical record entries made by the scribe were at my direction and in my presence.   I have reviewed the chart and agree that the record is accurate and complete.   Alanna Claire MD.  9:03 AM 07/22/2025     Clinical Impression:  Final diagnoses:  [S09.90XA] CHI (closed head injury), initial encounter (Primary)  [R51.9] Nonintractable headache, unspecified chronicity pattern, unspecified headache type          ED Disposition Condition    Discharge Stable          ED Prescriptions    None       Follow-up Information       Follow up With Specialties Details Why Contact Info    Nicolasa Salas MD Family Medicine  If symptoms worsen [address] [place], [place] [id number] [phone]                     [1]   Social History  Tobacco Use    Smoking status: Some Days     Types: Vaping with nicotine     Passive exposure: Never    Smokeless tobacco: Never   Vaping Use    Vaping status: Never Used   Substance Use Topics    Alcohol use: No    Drug use: Yes     Types: Marijuana        Alanna Claire MD  07/22/25 0973